# Patient Record
Sex: FEMALE | Race: WHITE | Employment: OTHER | URBAN - METROPOLITAN AREA
[De-identification: names, ages, dates, MRNs, and addresses within clinical notes are randomized per-mention and may not be internally consistent; named-entity substitution may affect disease eponyms.]

---

## 2020-11-13 ENCOUNTER — HOSPITAL ENCOUNTER (EMERGENCY)
Age: 55
Discharge: HOME | End: 2020-11-13
Attending: EMERGENCY MEDICINE
Payer: COMMERCIAL

## 2020-11-13 VITALS
HEART RATE: 90 BPM | OXYGEN SATURATION: 99 % | RESPIRATION RATE: 18 BRPM | TEMPERATURE: 98.2 F | WEIGHT: 223 LBS | BODY MASS INDEX: 42.1 KG/M2 | DIASTOLIC BLOOD PRESSURE: 56 MMHG | HEIGHT: 61 IN | SYSTOLIC BLOOD PRESSURE: 131 MMHG

## 2020-11-13 DIAGNOSIS — T78.40XA ALLERGIC REACTION, INITIAL ENCOUNTER: Primary | ICD-10-CM

## 2020-11-13 PROCEDURE — 96375 TX/PRO/DX INJ NEW DRUG ADDON: CPT

## 2020-11-13 PROCEDURE — 96374 THER/PROPH/DIAG INJ IV PUSH: CPT

## 2020-11-13 PROCEDURE — 99284 EMERGENCY DEPT VISIT MOD MDM: CPT

## 2020-11-13 PROCEDURE — 99283 EMERGENCY DEPT VISIT LOW MDM: CPT

## 2020-11-13 PROCEDURE — 74011250636 HC RX REV CODE- 250/636: Performed by: EMERGENCY MEDICINE

## 2020-11-13 RX ORDER — CARBAMAZEPINE 100 MG/1
100 TABLET, CHEWABLE ORAL 3 TIMES DAILY
Status: ON HOLD | COMMUNITY
End: 2021-12-02

## 2020-11-13 RX ORDER — PREDNISONE 10 MG/1
20 TABLET ORAL
COMMUNITY
End: 2020-11-14 | Stop reason: SDUPTHER

## 2020-11-13 RX ORDER — METHYLPREDNISOLONE 4 MG/1
TABLET ORAL
Qty: 1 DOSE PACK | Refills: 0 | Status: SHIPPED | OUTPATIENT
Start: 2020-11-13 | End: 2020-11-14 | Stop reason: SDUPTHER

## 2020-11-13 RX ORDER — DIPHENHYDRAMINE HYDROCHLORIDE 50 MG/ML
25 INJECTION, SOLUTION INTRAMUSCULAR; INTRAVENOUS
Status: COMPLETED | OUTPATIENT
Start: 2020-11-13 | End: 2020-11-13

## 2020-11-13 RX ORDER — ERGOCALCIFEROL 1.25 MG/1
50000 CAPSULE ORAL
COMMUNITY

## 2020-11-13 RX ORDER — FAMOTIDINE 10 MG/ML
20 INJECTION INTRAVENOUS
Status: COMPLETED | OUTPATIENT
Start: 2020-11-13 | End: 2020-11-13

## 2020-11-13 RX ORDER — OMEPRAZOLE 40 MG/1
40 CAPSULE, DELAYED RELEASE ORAL DAILY
COMMUNITY

## 2020-11-13 RX ORDER — CETIRIZINE HCL 10 MG
10 TABLET ORAL DAILY
Qty: 20 TAB | Refills: 0 | Status: ON HOLD | OUTPATIENT
Start: 2020-11-13 | End: 2021-12-02

## 2020-11-13 RX ADMIN — DIPHENHYDRAMINE HYDROCHLORIDE 25 MG: 50 INJECTION, SOLUTION INTRAMUSCULAR; INTRAVENOUS at 09:08

## 2020-11-13 RX ADMIN — DIPHENHYDRAMINE HYDROCHLORIDE 25 MG: 50 INJECTION, SOLUTION INTRAMUSCULAR; INTRAVENOUS at 08:26

## 2020-11-13 RX ADMIN — FAMOTIDINE 20 MG: 10 INJECTION, SOLUTION INTRAVENOUS at 08:26

## 2020-11-13 RX ADMIN — METHYLPREDNISOLONE SODIUM SUCCINATE 125 MG: 125 INJECTION, POWDER, FOR SOLUTION INTRAMUSCULAR; INTRAVENOUS at 08:26

## 2020-11-13 RX ADMIN — SODIUM CHLORIDE 1000 ML: 9 INJECTION, SOLUTION INTRAVENOUS at 09:09

## 2020-11-13 NOTE — ED PROVIDER NOTES
EMERGENCY DEPARTMENT HISTORY AND PHYSICAL EXAM      Date: 11/13/2020  Patient Name: Radha Dover      History of Presenting Illness     Chief Complaint   Patient presents with    Allergic Reaction       History Provided By: Patient and Patient's     HPI: Radha Dover, 60 y.o. female with a past medical history significant hypertension and hyperlipidemia presents to the ED with cc of rash. She went to [facility] 2 days ago for back pain and sciatica. She was given Toradol and a flu shot and prescriptions for hydrocodone and prednisone. After starting the medication she developed a generalized raised red rash on her face arms trunk and legs. [facility] and they told her to stop taking the medications. Her symptoms have not improved after taking an antihistamine. She comes in today with the rash, lip swelling, but no airway compromise. She is maintaining her own airway, able to control her secretions. No tongue swelling and no stridor. There are no other complaints, changes, or physical findings at this time. PCP: Yaw Westbrook NP    Current Outpatient Medications   Medication Sig Dispense Refill    omeprazole (PRILOSEC) 40 mg capsule Take 40 mg by mouth daily.  ergocalciferol (Vitamin D2) 1,250 mcg (50,000 unit) capsule Take 50,000 Units by mouth.  carBAMazepine (TEGretol) 100 mg chewable tablet Take 100 mg by mouth three (3) times daily.  predniSONE (DELTASONE) 10 mg tablet Take 20 mg by mouth daily (with breakfast).  cetirizine (ZyrTEC) 10 mg tablet Take 1 Tab by mouth daily. 20 Tab 0    methylPREDNISolone (Medrol, Jose,) 4 mg tablet Per dose pack instructions 1 Dose Pack 0    levothyroxine (SYNTHROID) 25 mcg tablet Take 1 Tab by mouth daily (before breakfast). After six  Refills to be done by Dr. Jessica Rivers 30 Tab 6    amitriptyline (ELAVIL) 25 mg tablet Take 25 mg by mouth nightly as needed.       simvastatin (ZOCOR) 20 mg tablet Take 20 mg by mouth nightly.  hydrochlorothiazide (HYDRODIURIL) 25 mg tablet Take 12.5 mg by mouth daily.  amitriptyline (ELAVIL) 100 mg tablet Take 100 mg by mouth daily.  eletriptan (RELPAX) 40 mg tablet Take 40 mg by mouth as needed. may repeat in 2 hours if necessary         Past History     Past Medical History:  Past Medical History:   Diagnosis Date    Chronic migraine without aura     High cholesterol     Thyroid disease        Past Surgical History:  Past Surgical History:   Procedure Laterality Date    HX  SECTION      twice    HX CHOLECYSTECTOMY      HX HERNIA REPAIR      HX ORTHOPAEDIC         Family History:  Family History   Problem Relation Age of Onset    Hypertension Father        Social History:  Social History     Tobacco Use    Smoking status: Never Smoker    Smokeless tobacco: Never Used   Substance Use Topics    Alcohol use: No    Drug use: No       Allergies: Allergies   Allergen Reactions    Sulfa (Sulfonamide Antibiotics) Hives         Review of Systems       Review of Systems   Constitutional: Negative for chills, fatigue and fever. HENT: Positive for facial swelling. Negative for congestion, drooling, sore throat, trouble swallowing and voice change. Eyes: Negative for photophobia and itching. Respiratory: Negative for cough, chest tightness, shortness of breath, wheezing and stridor. Cardiovascular: Negative for chest pain and palpitations. Gastrointestinal: Negative for abdominal pain, nausea and vomiting. Genitourinary: Negative for difficulty urinating. Musculoskeletal: Positive for back pain. Negative for myalgias, neck pain and neck stiffness. Skin: Positive for rash. Neurological: Negative for dizziness, speech difficulty, weakness and light-headedness. Psychiatric/Behavioral: Negative for agitation and confusion. The patient is not nervous/anxious.         Physical Exam     Physical Exam  Constitutional:       General: She is not in acute distress. Appearance: Normal appearance. She is not toxic-appearing. HENT:      Head: Normocephalic and atraumatic. Right Ear: External ear normal.      Left Ear: External ear normal.      Nose: Nose normal. No congestion or rhinorrhea. Mouth/Throat:      Mouth: Mucous membranes are moist.      Pharynx: No oropharyngeal exudate or posterior oropharyngeal erythema. Comments: Swollen lips. No swelling of tongue. No swelling in pharynx. Uvula visible  Eyes:      Conjunctiva/sclera: Conjunctivae normal.   Neck:      Musculoskeletal: Normal range of motion. No neck rigidity or muscular tenderness. Cardiovascular:      Rate and Rhythm: Normal rate and regular rhythm. Pulses: Normal pulses. Heart sounds: Normal heart sounds. No murmur. Pulmonary:      Effort: Pulmonary effort is normal. No respiratory distress. Breath sounds: Normal breath sounds. No stridor. No wheezing. Abdominal:      General: Bowel sounds are normal. There is no distension. Palpations: Abdomen is soft. Tenderness: There is no abdominal tenderness. Musculoskeletal: Normal range of motion. General: No swelling or tenderness. Right lower leg: No edema. Left lower leg: No edema. Skin:     General: Skin is warm and dry. Capillary Refill: Capillary refill takes less than 2 seconds. Findings: Rash present. Rash is macular, papular and urticarial.   Neurological:      General: No focal deficit present. Mental Status: She is alert and oriented to person, place, and time. Sensory: No sensory deficit. Motor: No weakness. Gait: Gait normal.   Psychiatric:         Mood and Affect: Mood normal.         Behavior: Behavior normal.         Thought Content: Thought content normal.         Judgment: Judgment normal.         Lab and Diagnostic Study Results     Labs -   No results found for this or any previous visit (from the past 12 hour(s)).     Radiologic Studies -   [unfilled]  CT Results  (Last 48 hours)    None        CXR Results  (Last 48 hours)    None          Medical Decision Making and ED Course   - I am the first and primary provider for this patient. - I reviewed the vital signs, available nursing notes, past medical history, past surgical history, family history and social history. - Initial assessment performed. The patients presenting problems have been discussed, and the staff are in agreement with the care plan formulated and outlined with them. I have encouraged them to ask questions as they arise throughout their visit. Vital Signs-Reviewed the patient's vital signs. Patient Vitals for the past 12 hrs:   Temp Pulse Resp BP SpO2   11/13/20 0936     99 %   11/13/20 0936  90 18 (!) 131/56 99 %   11/13/20 0903     100 %   11/13/20 0901  89 18 135/72 100 %   11/13/20 0814 98.2 °F (36.8 °C) 95 18 105/79 100 %         Records Reviewed: Nursing Notes    The patient presents with rash with a differential diagnosis of drug reaction, anaphylaxis, urticaria    ED Course: We will treat for allergic reaction with steroids, H1 and H2 blockers and monitor response. ED Course as of Nov 13 0948   Fri Nov 13, 2020   0920 Patient has yet to improve but she is still able to tolerate p.o. and has no increased edema in her airway. Will redose with Benadryl and start IV fluids and reevaluate.    [CS]      ED Course User Index  [CS] Ria Gill MD         Provider Notes (Medical Decision Making): The patient has not gotten worse since she has been in the emergency room. She continues to have no appreciable airway compromise and is handling her secretions without difficulty. Upon further review she has had the symptoms for approximately 3 days. She has had a new Tegretol prescription as well as a recent flu shot. She is not on an ACE inhibitor and I do not appreciate any other aspects of angioedema.   Reinspection of her airway reveals that it is patent without edema. I am going to place the patient on steroids for the next couple of days and will have her follow-up with her primary care physician for further evaluation and treatment. I have discussed the case with her primary care provider, Penny Urbina NP, who conveys that she feels as if the patient had allergic reaction approximately year ago and may benefit from outpatient allergy evaluation. She is to call the patient on Monday and attempt to have her seen and reevaluated. She agrees with care plan as outlined. MDM           Consultations:       Consultations: -    Family Practice Consultant: Dr. Sydni Arizmendi: We have asked for emergent assistance with regard to this patient. We have discussed the patients presenting complaints as well as all lab and radiology studies available. They are in agreement with the care plan as outlined. Procedures and Critical Care       Performed by: Adriana Gutierrez MD  PROCEDURES:  Procedures         Disposition     Disposition: Condition stable  DC- Adult Discharges: All of the diagnostic tests were reviewed and questions answered. Diagnosis, care plan and treatment options were discussed. The patient understands the instructions and will follow up as directed. The patients results have been reviewed with them. They have been counseled regarding their diagnosis. The patient verbally convey understanding and agreement of the signs, symptoms, diagnosis, treatment and prognosis and additionally agrees to follow up as recommended with their PCP in 24 - 48 hours. They also agree with the care-plan and convey that all of their questions have been answered.   I have also put together some discharge instructions for them that include: 1) educational information regarding their diagnosis, 2) how to care for their diagnosis at home, as well a 3) list of reasons why they would want to return to the ED prior to their follow-up appointment, should their condition change. DC-The patient was given verbal allergic reaction instructions    Discharged    Remove if not discharged  DISCHARGE PLAN:  1. Current Discharge Medication List      CONTINUE these medications which have NOT CHANGED    Details   levothyroxine (SYNTHROID) 25 mcg tablet Take 1 Tab by mouth daily (before breakfast). After six  Refills to be done by Dr. Viviane Nolasco: 30 Tab, Refills: 6      !! amitriptyline (ELAVIL) 25 mg tablet Take 25 mg by mouth nightly as needed. EPIPEN 0.3 mg/0.3 mL injection       simvastatin (ZOCOR) 20 mg tablet Take 20 mg by mouth nightly. topiramate (TOPAMAX) 100 mg tablet Take 100 mg by mouth two (2) times a day. hydrochlorothiazide (HYDRODIURIL) 25 mg tablet Take 12.5 mg by mouth daily. !! amitriptyline (ELAVIL) 100 mg tablet Take 100 mg by mouth daily. eletriptan (RELPAX) 40 mg tablet Take 40 mg by mouth as needed. may repeat in 2 hours if necessary       !! - Potential duplicate medications found. Please discuss with provider. 2.   Follow-up Information     Follow up With Specialties Details Why Contact Info    [facility], Jimmy Scott NP Nurse Practitioner   [address]  [phone]          3. Return to ED if worse   4. Current Discharge Medication List      START taking these medications    Details   cetirizine (ZyrTEC) 10 mg tablet Take 1 Tab by mouth daily. Qty: 20 Tab, Refills: 0      methylPREDNISolone (Medrol, Jose,) 4 mg tablet Per dose pack instructions  Qty: 1 Dose Pack, Refills: 0             Diagnosis     Clinical Impression:   1. Allergic reaction, initial encounter        Attestations:    Aracelis Mesa MD    Please note that this dictation was completed with [employer], the [employer] voice recognition software. Quite often unanticipated grammatical, syntax, homophones, and other interpretive errors are inadvertently transcribed by the computer software. Please disregard these errors. Please excuse any errors that have escaped final proofreading. Thank you.

## 2020-11-13 NOTE — ED TRIAGE NOTES
[facility] and gave hydrocodone, flu shot, prednisone, toradol, rash started day after meds hives on arm, went [facility], they gave her another med with zyrtec but can remember, rash only gotten worse all over body including lips, lungs clear, air way clear, air way feels scratchy but no resp distress

## 2020-11-14 ENCOUNTER — HOSPITAL ENCOUNTER (INPATIENT)
Age: 55
LOS: 4 days | Discharge: HOME | DRG: 607 | End: 2020-11-19
Attending: INTERNAL MEDICINE | Admitting: INTERNAL MEDICINE
Payer: COMMERCIAL

## 2020-11-14 DIAGNOSIS — R21 RASH: ICD-10-CM

## 2020-11-14 DIAGNOSIS — T78.3XXA ALLERGIC ANGIOEDEMA, INITIAL ENCOUNTER: ICD-10-CM

## 2020-11-14 DIAGNOSIS — L29.9 ITCHING: ICD-10-CM

## 2020-11-14 DIAGNOSIS — T78.40XA ALLERGIC REACTION, INITIAL ENCOUNTER: Primary | ICD-10-CM

## 2020-11-14 DIAGNOSIS — L50.9 HIVES: ICD-10-CM

## 2020-11-14 PROBLEM — T80.52XA: Status: ACTIVE | Noted: 2020-11-14

## 2020-11-14 PROCEDURE — 99218 HC RM OBSERVATION: CPT

## 2020-11-14 PROCEDURE — 96374 THER/PROPH/DIAG INJ IV PUSH: CPT

## 2020-11-14 PROCEDURE — 96376 TX/PRO/DX INJ SAME DRUG ADON: CPT

## 2020-11-14 PROCEDURE — 96361 HYDRATE IV INFUSION ADD-ON: CPT

## 2020-11-14 PROCEDURE — 96372 THER/PROPH/DIAG INJ SC/IM: CPT

## 2020-11-14 PROCEDURE — 74011250636 HC RX REV CODE- 250/636: Performed by: EMERGENCY MEDICINE

## 2020-11-14 PROCEDURE — 74011250637 HC RX REV CODE- 250/637: Performed by: NURSE PRACTITIONER

## 2020-11-14 PROCEDURE — 74011250636 HC RX REV CODE- 250/636: Performed by: NURSE PRACTITIONER

## 2020-11-14 PROCEDURE — 96375 TX/PRO/DX INJ NEW DRUG ADDON: CPT

## 2020-11-14 PROCEDURE — 74011250636 HC RX REV CODE- 250/636: Performed by: PHYSICIAN ASSISTANT

## 2020-11-14 PROCEDURE — 99284 EMERGENCY DEPT VISIT MOD MDM: CPT

## 2020-11-14 RX ORDER — EPINEPHRINE 0.3 MG/.3ML
0.3 INJECTION SUBCUTANEOUS ONCE
Status: COMPLETED | OUTPATIENT
Start: 2020-11-14 | End: 2020-11-14

## 2020-11-14 RX ORDER — PANTOPRAZOLE SODIUM 20 MG/1
40 TABLET, DELAYED RELEASE ORAL DAILY
Status: DISCONTINUED | OUTPATIENT
Start: 2020-11-15 | End: 2020-11-19 | Stop reason: HOSPADM

## 2020-11-14 RX ORDER — IPRATROPIUM BROMIDE AND ALBUTEROL SULFATE 2.5; .5 MG/3ML; MG/3ML
3 SOLUTION RESPIRATORY (INHALATION)
Status: DISCONTINUED | OUTPATIENT
Start: 2020-11-14 | End: 2020-11-15

## 2020-11-14 RX ORDER — FAMOTIDINE 20 MG/1
20 TABLET, FILM COATED ORAL 2 TIMES DAILY
Status: DISCONTINUED | OUTPATIENT
Start: 2020-11-14 | End: 2020-11-15

## 2020-11-14 RX ORDER — BISACODYL 5 MG
5 TABLET, DELAYED RELEASE (ENTERIC COATED) ORAL DAILY PRN
Status: DISCONTINUED | OUTPATIENT
Start: 2020-11-14 | End: 2020-11-19 | Stop reason: HOSPADM

## 2020-11-14 RX ORDER — ATORVASTATIN CALCIUM 10 MG/1
10 TABLET, FILM COATED ORAL
Status: DISCONTINUED | OUTPATIENT
Start: 2020-11-14 | End: 2020-11-19 | Stop reason: HOSPADM

## 2020-11-14 RX ORDER — DIPHENHYDRAMINE HYDROCHLORIDE 50 MG/ML
25 INJECTION, SOLUTION INTRAMUSCULAR; INTRAVENOUS EVERY 6 HOURS
Status: DISCONTINUED | OUTPATIENT
Start: 2020-11-14 | End: 2020-11-15

## 2020-11-14 RX ORDER — SIMVASTATIN 10 MG/1
20 TABLET, FILM COATED ORAL
Status: DISCONTINUED | OUTPATIENT
Start: 2020-11-14 | End: 2020-11-14

## 2020-11-14 RX ORDER — DEXAMETHASONE SODIUM PHOSPHATE 10 MG/ML
10 INJECTION INTRAMUSCULAR; INTRAVENOUS ONCE
Status: COMPLETED | OUTPATIENT
Start: 2020-11-14 | End: 2020-11-14

## 2020-11-14 RX ORDER — ENOXAPARIN SODIUM 100 MG/ML
40 INJECTION SUBCUTANEOUS DAILY
Status: DISCONTINUED | OUTPATIENT
Start: 2020-11-15 | End: 2020-11-19 | Stop reason: HOSPADM

## 2020-11-14 RX ORDER — POLYETHYLENE GLYCOL 3350 17 G/17G
17 POWDER, FOR SOLUTION ORAL DAILY PRN
Status: DISCONTINUED | OUTPATIENT
Start: 2020-11-14 | End: 2020-11-19 | Stop reason: HOSPADM

## 2020-11-14 RX ORDER — ACETAMINOPHEN 325 MG/1
650 TABLET ORAL
Status: DISCONTINUED | OUTPATIENT
Start: 2020-11-14 | End: 2020-11-19 | Stop reason: HOSPADM

## 2020-11-14 RX ORDER — ONDANSETRON 4 MG/1
4 TABLET, ORALLY DISINTEGRATING ORAL
Status: DISCONTINUED | OUTPATIENT
Start: 2020-11-14 | End: 2020-11-19 | Stop reason: HOSPADM

## 2020-11-14 RX ORDER — EPINEPHRINE 0.1 MG/ML
1 INJECTION INTRACARDIAC; INTRAVENOUS
Status: DISCONTINUED | OUTPATIENT
Start: 2020-11-14 | End: 2020-11-14

## 2020-11-14 RX ORDER — DIPHENHYDRAMINE HYDROCHLORIDE 50 MG/ML
25 INJECTION, SOLUTION INTRAMUSCULAR; INTRAVENOUS
Status: COMPLETED | OUTPATIENT
Start: 2020-11-14 | End: 2020-11-14

## 2020-11-14 RX ORDER — ONDANSETRON 2 MG/ML
4 INJECTION INTRAMUSCULAR; INTRAVENOUS
Status: DISCONTINUED | OUTPATIENT
Start: 2020-11-14 | End: 2020-11-19 | Stop reason: HOSPADM

## 2020-11-14 RX ORDER — IBUPROFEN 400 MG/1
400 TABLET ORAL 3 TIMES DAILY
Status: DISCONTINUED | OUTPATIENT
Start: 2020-11-14 | End: 2020-11-15

## 2020-11-14 RX ORDER — SODIUM CHLORIDE 9 MG/ML
125 INJECTION, SOLUTION INTRAVENOUS CONTINUOUS
Status: DISCONTINUED | OUTPATIENT
Start: 2020-11-14 | End: 2020-11-18

## 2020-11-14 RX ORDER — ACETAMINOPHEN 650 MG/1
650 SUPPOSITORY RECTAL
Status: DISCONTINUED | OUTPATIENT
Start: 2020-11-14 | End: 2020-11-19 | Stop reason: HOSPADM

## 2020-11-14 RX ADMIN — IBUPROFEN 400 MG: 400 TABLET, FILM COATED ORAL at 20:39

## 2020-11-14 RX ADMIN — SIMVASTATIN 20 MG: 10 TABLET, FILM COATED ORAL at 20:39

## 2020-11-14 RX ADMIN — METHYLPREDNISOLONE SODIUM SUCCINATE 40 MG: 40 INJECTION, POWDER, FOR SOLUTION INTRAMUSCULAR; INTRAVENOUS at 17:41

## 2020-11-14 RX ADMIN — SODIUM CHLORIDE 125 ML/HR: 9 INJECTION, SOLUTION INTRAVENOUS at 17:41

## 2020-11-14 RX ADMIN — DEXAMETHASONE SODIUM PHOSPHATE 10 MG: 10 INJECTION, SOLUTION INTRAMUSCULAR; INTRAVENOUS at 12:31

## 2020-11-14 RX ADMIN — EPINEPHRINE 0.3 MG: 0.3 INJECTION INTRAMUSCULAR at 15:00

## 2020-11-14 RX ADMIN — DIPHENHYDRAMINE HYDROCHLORIDE 25 MG: 50 INJECTION, SOLUTION INTRAMUSCULAR; INTRAVENOUS at 12:31

## 2020-11-14 RX ADMIN — FAMOTIDINE 20 MG: 20 TABLET ORAL at 20:39

## 2020-11-14 RX ADMIN — DIPHENHYDRAMINE HYDROCHLORIDE 25 MG: 50 INJECTION, SOLUTION INTRAMUSCULAR; INTRAVENOUS at 17:41

## 2020-11-14 RX ADMIN — SODIUM CHLORIDE 500 ML: 9 INJECTION, SOLUTION INTRAVENOUS at 12:31

## 2020-11-14 RX ADMIN — FAMOTIDINE 10 MG: 10 INJECTION, SOLUTION INTRAVENOUS at 12:31

## 2020-11-14 NOTE — H&P
History and Physical    Patient: Darek Benavides MRN: [id number]  SSN: [id number]    YOB: 1965  Age: 60 y.o. Sex: female      Subjective: Darek Benavides is a 60 y.o. female who has PMH significant for chronic pain, hypothyroidism and seasonal allergies. She comes into the emergency room after an allergic reaction to flu shot she received on Wednesday. She started having a reaction the next morning with ulcers in her mouth, sore throat with edema, diffuse erythematous rash covering face arms trunk and legs. She went back to emergent outpatient care center was given prednisone and Atarax without relief. She comes to this emergency room with same findings diffuse whole-body rash with oral mucosal involvement, and associated symptoms of itching. Received 1 dose of epi and Benadryl in the ED without relief. Will admit to inpatient. Will start repeat dose of epi, continuous IV fluids, every 6 hour Benadryl IV, every 6 hours Solu-Medrol, scheduled ibuprofen, duo nebs, oxygen, full liquid diet and speech evaluation. .     Past Medical History:   Diagnosis Date    Chronic migraine without aura     High cholesterol     Thyroid disease      Past Surgical History:   Procedure Laterality Date    HX  SECTION      twice    HX CHOLECYSTECTOMY      HX HERNIA REPAIR      HX ORTHOPAEDIC        Family History   Problem Relation Age of Onset    Hypertension Father      Social History     Tobacco Use    Smoking status: Never Smoker    Smokeless tobacco: Never Used   Substance Use Topics    Alcohol use: No      Prior to Admission medications    Medication Sig Start Date End Date Taking? Authorizing Provider   omeprazole (PRILOSEC) 40 mg capsule Take 40 mg by mouth daily. Yes Other, MD Casey   carBAMazepine (TEGretol) 100 mg chewable tablet Take 100 mg by mouth three (3) times daily. Yes Other, MD Casey   cetirizine (ZyrTEC) 10 mg tablet Take 1 Tab by mouth daily.  20  Yes Laney Loya MD   levothyroxine (SYNTHROID) 25 mcg tablet Take 1 Tab by mouth daily (before breakfast). After six  Refills to be done by Dr. Landon Ahumada 6/13/11  Yes Natalie Capellan MD   simvastatin (ZOCOR) 20 mg tablet Take 20 mg by mouth nightly. 4/4/11  Yes Provider, Historical   ergocalciferol (Vitamin D2) 1,250 mcg (50,000 unit) capsule Take 50,000 Units by mouth. Other, MD Casey   amitriptyline (ELAVIL) 25 mg tablet Take 25 mg by mouth nightly as needed. 4/4/11   Provider, Historical   hydrochlorothiazide (HYDRODIURIL) 25 mg tablet Take 12.5 mg by mouth daily. Provider, Historical   amitriptyline (ELAVIL) 100 mg tablet Take 100 mg by mouth daily. 4/4/11   Provider, Historical   eletriptan (RELPAX) 40 mg tablet Take 40 mg by mouth as needed. may repeat in 2 hours if necessary 4/4/11   Provider, Historical        Allergies   Allergen Reactions    Sulfa (Sulfonamide Antibiotics) Hives       Review of Systems:  Constitutional: No fevers, No chills, No fatigue, + weakness  HEENT: +++ Discomfort from rash on face and ulcers in mouth  Respiratory: No cough, No sputum, No wheezing, No SOB, throat tightness  Cardiovascular: No chest pain, No lower extremity edema, No Palpitations   Gastrointestinal: No nausea, No vomiting, No diarrhea, No constipation, No abdominal pain  Genitourinary: No frequency, No dysuria, No hematuria  Integument/Breast: No rash, No skin lesion(s), No dryness  Musculoskeletal: No arthralgias, No neck pain, No back pain  Neurological: No headaches, No dizziness, No confusion,  No seizures  Behavioral/Psychiatric: +++ anxiety, No depression      Objective:     Vitals:    11/14/20 1131 11/14/20 1612   BP: 128/73 122/70   Pulse: 93 83   Resp: 20 18   Temp: 98.8 °F (37.1 °C)    SpO2: 100% 99%   Weight: 101.2 kg (223 lb)    Height: 5' 1\" (1.549 m)         Physical Exam:  General: alert, cooperative, no distress  Eye: conjunctivae/corneas clear. PERRL, EOM's intact.    Throat and Neck: Use disseminated rash covering face, ears, ulcerations and mouth, neck  Lung: clear to auscultation bilaterally  Heart: regular rate and rhythm,   Abdomen: soft, non-tender. Bowel sounds normal. No masses,  Extremities:  able to move all extremities normal, atraumatic  Skin: Diffuse disseminated rash covering entire body involving oral mucosa of the mouth   neurologic: AOx3. Cranial nerves 2-12 and sensation grossly intact. Psychiatric: non focal    No results found for this or any previous visit (from the past 24 hour(s)). XR Results (maximum last 3): No results found for this or any previous visit. CT Results (maximum last 3): No results found for this or any previous visit. MRI Results (maximum last 3): No results found for this or any previous visit. Nuclear Medicine Results (maximum last 3): Results from Abstract encounter on 04/21/11   NM THYROID IMAGING W UPTAKE MULTIPLE       US Results (maximum last 3): No results found for this or any previous visit. Active Problems:    Allergic reaction (11/14/2020)      Anaphylactic reaction due to vaccination (11/14/2020)      Hives (11/14/2020)      Angioedema (11/14/2020)      Itching (11/14/2020)        Assessment/Plan:     1. Anaphylactic reaction to flu vaccine:  2. Oral ulcerations and angioedema:  3.   Diffuse rash/hives covering body:  · Repeat epi injection  · Schedule IV Benadryl every 6 hours, IV Solu-Medrol every 6 hours, duo nebs every 6 hours, ibuprofen every 8 hours  · IV fluids  · Oxygen  · Speech therapy for evaluation  · Full liquid diet    DVT Prophylaxis: Lovenox  Code Status: Full  POA/NOK:   Total Time spent in direct and indirect care including assessment review of labs and coordination of services and consultations: Greater than 75 minutes      Signed By: Elizabeth Loya NP     November 14, 2020

## 2020-11-14 NOTE — ROUTINE PROCESS
TRANSFER - OUT REPORT: 
 
Verbal report given to Anshu Ward on Darci Arzate  being transferred to [facility] routine progression of care Report consisted of patients Situation, Background, Assessment and  
Recommendations(SBAR). Information from the following report(s) SBAR was reviewed with the receiving nurse. Lines:  
Peripheral IV 11/14/20 Left Antecubital (Active) Opportunity for questions and clarification was provided. Patient transported with: 
 [employer]

## 2020-11-14 NOTE — ED TRIAGE NOTES
Patient reports hives that have been spreading since Thursday, received a flu shot on Tuesday, has been seen at two different facility for the reaction with no relief. Reports pain while swallowing.

## 2020-11-14 NOTE — ED PROVIDER NOTES
EMERGENCY DEPARTMENT HISTORY AND PHYSICAL EXAM      Date: 11/14/2020  Patient Name: Josue Lyons    History of Presenting Illness     Chief Complaint   Patient presents with    Allergic Reaction       History Provided By: Patient    HPI: Josue Lyons, 60 y.o. female with a past medical history significant for hypothyroidism who   presents to the ED with cc of allergic reaction since Wednesday. Pt states that she had a flu shot on Wednesday and rash started not long after that. Pt states that she was given prednisone and atarax but only took one pill of each without relief. Pt denies chest pain or sob. There are no other complaints, changes, or physical findings at this time. PCP: Oliva Leon NP    No current facility-administered medications on file prior to encounter. Current Outpatient Medications on File Prior to Encounter   Medication Sig Dispense Refill    omeprazole (PRILOSEC) 40 mg capsule Take 40 mg by mouth daily.  ergocalciferol (Vitamin D2) 1,250 mcg (50,000 unit) capsule Take 50,000 Units by mouth.  carBAMazepine (TEGretol) 100 mg chewable tablet Take 100 mg by mouth three (3) times daily.  predniSONE (DELTASONE) 10 mg tablet Take 20 mg by mouth daily (with breakfast).  cetirizine (ZyrTEC) 10 mg tablet Take 1 Tab by mouth daily. 20 Tab 0    methylPREDNISolone (Medrol, Jose,) 4 mg tablet Per dose pack instructions 1 Dose Pack 0    levothyroxine (SYNTHROID) 25 mcg tablet Take 1 Tab by mouth daily (before breakfast). After six  Refills to be done by Dr. Casie Hardwick 30 Tab 6    amitriptyline (ELAVIL) 25 mg tablet Take 25 mg by mouth nightly as needed.  simvastatin (ZOCOR) 20 mg tablet Take 20 mg by mouth nightly.  hydrochlorothiazide (HYDRODIURIL) 25 mg tablet Take 12.5 mg by mouth daily.  amitriptyline (ELAVIL) 100 mg tablet Take 100 mg by mouth daily.  eletriptan (RELPAX) 40 mg tablet Take 40 mg by mouth as needed.  may repeat in 2 hours if necessary         Past History     Past Medical History:  Past Medical History:   Diagnosis Date    Chronic migraine without aura     High cholesterol     Thyroid disease        Past Surgical History:  Past Surgical History:   Procedure Laterality Date    HX  SECTION      twice    HX CHOLECYSTECTOMY      HX HERNIA REPAIR      HX ORTHOPAEDIC         Family History:  Family History   Problem Relation Age of Onset    Hypertension Father        Social History:  Social History     Tobacco Use    Smoking status: Never Smoker    Smokeless tobacco: Never Used   Substance Use Topics    Alcohol use: No    Drug use: No       Allergies: Allergies   Allergen Reactions    Sulfa (Sulfonamide Antibiotics) Hives         Review of Systems     Review of Systems   Constitutional: Negative. HENT: Negative. Eyes: Negative. Respiratory: Negative. Cardiovascular: Negative. Gastrointestinal: Negative. Endocrine: Negative. Genitourinary: Negative. Musculoskeletal: Negative. Skin: Positive for rash. Allergic/Immunologic: Negative. Neurological: Negative. Hematological: Negative. Psychiatric/Behavioral: Negative. Physical Exam     Physical Exam  Vitals signs and nursing note reviewed. Constitutional:       Appearance: Normal appearance. Cardiovascular:      Rate and Rhythm: Normal rate and regular rhythm. Pulmonary:      Effort: Pulmonary effort is normal.      Breath sounds: Normal breath sounds. Skin:     Findings: Rash present. Comments: + GENERALIZED URTICARIA RASH NOTED   Neurological:      General: No focal deficit present. Mental Status: She is alert and oriented to person, place, and time. Psychiatric:         Mood and Affect: Mood normal.         Behavior: Behavior normal.         Lab and Diagnostic Study Results     Labs -   No results found for this or any previous visit (from the past 12 hour(s)).     Radiologic Studies -   @lastxrresult@  CT Results  (Last 48 hours)    None        CXR Results  (Last 48 hours)    None            Medical Decision Making   - I am the first provider for this patient. - I reviewed the vital signs, available nursing notes, past medical history, past surgical history, family history and social history. - Initial assessment performed. The patients presenting problems have been discussed, and they are in agreement with the care plan formulated and outlined with them. I have encouraged them to ask questions as they arise throughout their visit. Vital Signs-Reviewed the patient's vital signs. Patient Vitals for the past 12 hrs:   Temp Pulse Resp BP SpO2   11/14/20 1612  83 18 122/70 99 %   11/14/20 1131 98.8 °F (37.1 °C) 93 20 128/73 100 %       Records Reviewed: Nursing Notes    The patient presents with a differential diagnosis of allergic reaction      ED Course:          Provider Notes (Medical Decision Making): MDM       Procedures   Medical Decision Makingedical Decision Making  Performed by: GREG Caraballo  PROCEDURES:  Procedures       Disposition   Disposition: Admitted to Observation Unit the case was discussed with the admitting physician [address] [place] [address] Drive:  1. Current Discharge Medication List      CONTINUE these medications which have NOT CHANGED    Details   omeprazole (PRILOSEC) 40 mg capsule Take 40 mg by mouth daily. ergocalciferol (Vitamin D2) 1,250 mcg (50,000 unit) capsule Take 50,000 Units by mouth.      carBAMazepine (TEGretol) 100 mg chewable tablet Take 100 mg by mouth three (3) times daily. predniSONE (DELTASONE) 10 mg tablet Take 20 mg by mouth daily (with breakfast). cetirizine (ZyrTEC) 10 mg tablet Take 1 Tab by mouth daily. Qty: 20 Tab, Refills: 0      methylPREDNISolone (Medrol, Jose,) 4 mg tablet Per dose pack instructions  Qty: 1 Dose Pack, Refills: 0      levothyroxine (SYNTHROID) 25 mcg tablet Take 1 Tab by mouth daily (before breakfast).  After six Refills to be done by Dr. Krunal Murphy: 30 Tab, Refills: 6      !! amitriptyline (ELAVIL) 25 mg tablet Take 25 mg by mouth nightly as needed. simvastatin (ZOCOR) 20 mg tablet Take 20 mg by mouth nightly. hydrochlorothiazide (HYDRODIURIL) 25 mg tablet Take 12.5 mg by mouth daily. !! amitriptyline (ELAVIL) 100 mg tablet Take 100 mg by mouth daily. eletriptan (RELPAX) 40 mg tablet Take 40 mg by mouth as needed. may repeat in 2 hours if necessary       !! - Potential duplicate medications found. Please discuss with provider. 2.   Follow-up Information    None       3. Return to ED if worse   4. Current Discharge Medication List            Diagnosis     Clinical Impression:   1. Allergic reaction, initial encounter        Attestations:    Nika Albright, PA    Please note that this dictation was completed with [employer], the computer voice recognition software. Quite often unanticipated grammatical, syntax, homophones, and other interpretive errors are inadvertently transcribed by the computer software. Please disregard these errors. Please excuse any errors that have escaped final proofreading. Thank you.

## 2020-11-15 PROBLEM — Z88.9 DRUG ALLERGY: Status: ACTIVE | Noted: 2020-11-15

## 2020-11-15 PROBLEM — T78.3XXA ALLERGIC ANGIOEDEMA: Status: ACTIVE | Noted: 2020-11-15

## 2020-11-15 LAB
ANION GAP SERPL CALC-SCNC: 6 MMOL/L (ref 5–15)
BUN SERPL-MCNC: 12 MG/DL (ref 6–20)
BUN/CREAT SERPL: 14 (ref 12–20)
CA-I BLD-MCNC: 8.2 MG/DL (ref 8.5–10.1)
CHLORIDE SERPL-SCNC: 108 MMOL/L (ref 97–108)
CO2 SERPL-SCNC: 24 MMOL/L (ref 21–32)
CREAT SERPL-MCNC: 0.85 MG/DL (ref 0.55–1.02)
ERYTHROCYTE [DISTWIDTH] IN BLOOD BY AUTOMATED COUNT: 15.2 % (ref 11.5–14.5)
GLUCOSE SERPL-MCNC: 149 MG/DL (ref 65–100)
HCT VFR BLD AUTO: 36.7 % (ref 35–47)
HGB BLD-MCNC: 11.7 G/DL (ref 11.5–16)
MCH RBC QN AUTO: 27.7 PG (ref 26–34)
MCHC RBC AUTO-ENTMCNC: 31.9 G/DL (ref 30–36.5)
MCV RBC AUTO: 86.8 FL (ref 80–99)
PLATELET # BLD AUTO: 304 K/UL (ref 150–400)
PMV BLD AUTO: 10 FL (ref 8.9–12.9)
POTASSIUM SERPL-SCNC: 4 MMOL/L (ref 3.5–5.1)
RBC # BLD AUTO: 4.23 M/UL (ref 3.8–5.2)
SODIUM SERPL-SCNC: 138 MMOL/L (ref 136–145)
WBC # BLD AUTO: 5.6 K/UL (ref 3.6–11)

## 2020-11-15 PROCEDURE — 74011250636 HC RX REV CODE- 250/636: Performed by: NURSE PRACTITIONER

## 2020-11-15 PROCEDURE — 94640 AIRWAY INHALATION TREATMENT: CPT

## 2020-11-15 PROCEDURE — 86663 EPSTEIN-BARR ANTIBODY: CPT

## 2020-11-15 PROCEDURE — 99218 HC RM OBSERVATION: CPT

## 2020-11-15 PROCEDURE — 96376 TX/PRO/DX INJ SAME DRUG ADON: CPT

## 2020-11-15 PROCEDURE — 99221 1ST HOSP IP/OBS SF/LOW 40: CPT | Performed by: INTERNAL MEDICINE

## 2020-11-15 PROCEDURE — 74011250637 HC RX REV CODE- 250/637: Performed by: INTERNAL MEDICINE

## 2020-11-15 PROCEDURE — 74011250637 HC RX REV CODE- 250/637: Performed by: NURSE PRACTITIONER

## 2020-11-15 PROCEDURE — 92610 EVALUATE SWALLOWING FUNCTION: CPT

## 2020-11-15 PROCEDURE — 80048 BASIC METABOLIC PNL TOTAL CA: CPT

## 2020-11-15 PROCEDURE — 74011000250 HC RX REV CODE- 250: Performed by: NURSE PRACTITIONER

## 2020-11-15 PROCEDURE — 65270000029 HC RM PRIVATE

## 2020-11-15 PROCEDURE — 86644 CMV ANTIBODY: CPT

## 2020-11-15 PROCEDURE — 36415 COLL VENOUS BLD VENIPUNCTURE: CPT

## 2020-11-15 PROCEDURE — 85027 COMPLETE CBC AUTOMATED: CPT

## 2020-11-15 RX ORDER — KETOTIFEN FUMARATE 0.35 MG/ML
1 SOLUTION/ DROPS OPHTHALMIC 2 TIMES DAILY
Status: DISCONTINUED | OUTPATIENT
Start: 2020-11-15 | End: 2020-11-19 | Stop reason: HOSPADM

## 2020-11-15 RX ORDER — IPRATROPIUM BROMIDE AND ALBUTEROL SULFATE 2.5; .5 MG/3ML; MG/3ML
3 SOLUTION RESPIRATORY (INHALATION)
Status: DISCONTINUED | OUTPATIENT
Start: 2020-11-15 | End: 2020-11-19 | Stop reason: HOSPADM

## 2020-11-15 RX ORDER — HYDROCHLOROTHIAZIDE 25 MG/1
12.5 TABLET ORAL DAILY
Status: DISCONTINUED | OUTPATIENT
Start: 2020-11-15 | End: 2020-11-19 | Stop reason: HOSPADM

## 2020-11-15 RX ORDER — CAMPHOR
SPIRIT TOPICAL 4 TIMES DAILY
Status: DISCONTINUED | OUTPATIENT
Start: 2020-11-15 | End: 2020-11-19 | Stop reason: HOSPADM

## 2020-11-15 RX ORDER — ALPRAZOLAM 0.5 MG/1
0.5 TABLET ORAL 2 TIMES DAILY
Status: DISCONTINUED | OUTPATIENT
Start: 2020-11-15 | End: 2020-11-19 | Stop reason: HOSPADM

## 2020-11-15 RX ORDER — LEVOTHYROXINE SODIUM 25 UG/1
25 TABLET ORAL
Status: DISCONTINUED | OUTPATIENT
Start: 2020-11-16 | End: 2020-11-19 | Stop reason: HOSPADM

## 2020-11-15 RX ORDER — HYDROXYZINE PAMOATE 50 MG/1
50 CAPSULE ORAL
Status: DISCONTINUED | OUTPATIENT
Start: 2020-11-15 | End: 2020-11-15

## 2020-11-15 RX ORDER — HYDROXYZINE 25 MG/1
50 TABLET, FILM COATED ORAL
Status: DISCONTINUED | OUTPATIENT
Start: 2020-11-15 | End: 2020-11-19 | Stop reason: HOSPADM

## 2020-11-15 RX ORDER — LIDOCAINE HYDROCHLORIDE 20 MG/ML
15 SOLUTION OROPHARYNGEAL AS NEEDED
Status: DISCONTINUED | OUTPATIENT
Start: 2020-11-15 | End: 2020-11-19 | Stop reason: HOSPADM

## 2020-11-15 RX ADMIN — IPRATROPIUM BROMIDE AND ALBUTEROL SULFATE 3 ML: .5; 3 SOLUTION RESPIRATORY (INHALATION) at 01:39

## 2020-11-15 RX ADMIN — HYDROXYZINE HYDROCHLORIDE 50 MG: 25 TABLET, FILM COATED ORAL at 18:37

## 2020-11-15 RX ADMIN — METHYLPREDNISOLONE SODIUM SUCCINATE 40 MG: 40 INJECTION, POWDER, FOR SOLUTION INTRAMUSCULAR; INTRAVENOUS at 11:37

## 2020-11-15 RX ADMIN — IBUPROFEN 400 MG: 400 TABLET, FILM COATED ORAL at 17:22

## 2020-11-15 RX ADMIN — DIPHENHYDRAMINE HYDROCHLORIDE, ZINC ACETATE: 2; .1 CREAM TOPICAL at 13:05

## 2020-11-15 RX ADMIN — LIDOCAINE HYDROCHLORIDE 15 ML: 20 SOLUTION ORAL; TOPICAL at 11:44

## 2020-11-15 RX ADMIN — ATORVASTATIN CALCIUM 10 MG: 10 TABLET, FILM COATED ORAL at 21:28

## 2020-11-15 RX ADMIN — IPRATROPIUM BROMIDE AND ALBUTEROL SULFATE 3 ML: .5; 3 SOLUTION RESPIRATORY (INHALATION) at 10:19

## 2020-11-15 RX ADMIN — CALAMINE 8% AND ZINC OXIDE 8%: 160 LOTION TOPICAL at 21:25

## 2020-11-15 RX ADMIN — METHYLPREDNISOLONE SODIUM SUCCINATE 40 MG: 40 INJECTION, POWDER, FOR SOLUTION INTRAMUSCULAR; INTRAVENOUS at 05:41

## 2020-11-15 RX ADMIN — FAMOTIDINE 20 MG: 20 TABLET ORAL at 09:06

## 2020-11-15 RX ADMIN — DIPHENHYDRAMINE HYDROCHLORIDE 25 MG: 50 INJECTION, SOLUTION INTRAMUSCULAR; INTRAVENOUS at 05:41

## 2020-11-15 RX ADMIN — METHYLPREDNISOLONE SODIUM SUCCINATE 40 MG: 40 INJECTION, POWDER, FOR SOLUTION INTRAMUSCULAR; INTRAVENOUS at 00:56

## 2020-11-15 RX ADMIN — DIPHENHYDRAMINE HYDROCHLORIDE 25 MG: 50 INJECTION, SOLUTION INTRAMUSCULAR; INTRAVENOUS at 00:56

## 2020-11-15 RX ADMIN — ALPRAZOLAM 0.5 MG: 0.5 TABLET ORAL at 21:27

## 2020-11-15 RX ADMIN — KETOTIFEN FUMARATE 1 DROP: 0.35 SOLUTION/ DROPS OPHTHALMIC at 21:26

## 2020-11-15 RX ADMIN — DIPHENHYDRAMINE HYDROCHLORIDE 25 MG: 50 INJECTION, SOLUTION INTRAMUSCULAR; INTRAVENOUS at 17:22

## 2020-11-15 RX ADMIN — PANTOPRAZOLE SODIUM 40 MG: 20 TABLET, DELAYED RELEASE ORAL at 09:06

## 2020-11-15 RX ADMIN — SODIUM CHLORIDE 125 ML/HR: 9 INJECTION, SOLUTION INTRAVENOUS at 00:56

## 2020-11-15 RX ADMIN — FAMOTIDINE 20 MG: 10 INJECTION INTRAVENOUS at 21:28

## 2020-11-15 RX ADMIN — METHYLPREDNISOLONE SODIUM SUCCINATE 40 MG: 40 INJECTION, POWDER, FOR SOLUTION INTRAMUSCULAR; INTRAVENOUS at 17:21

## 2020-11-15 RX ADMIN — LIDOCAINE HYDROCHLORIDE 15 ML: 20 SOLUTION ORAL; TOPICAL at 10:44

## 2020-11-15 RX ADMIN — METHYLPREDNISOLONE SODIUM SUCCINATE 125 MG: 125 INJECTION, POWDER, FOR SOLUTION INTRAMUSCULAR; INTRAVENOUS at 21:28

## 2020-11-15 RX ADMIN — SODIUM CHLORIDE 125 ML/HR: 9 INJECTION, SOLUTION INTRAVENOUS at 18:37

## 2020-11-15 RX ADMIN — LIDOCAINE HYDROCHLORIDE 15 ML: 20 SOLUTION ORAL; TOPICAL at 17:20

## 2020-11-15 RX ADMIN — KETOTIFEN FUMARATE 1 DROP: 0.35 SOLUTION/ DROPS OPHTHALMIC at 13:05

## 2020-11-15 RX ADMIN — IBUPROFEN 400 MG: 400 TABLET, FILM COATED ORAL at 09:06

## 2020-11-15 RX ADMIN — SODIUM CHLORIDE 125 ML/HR: 9 INJECTION, SOLUTION INTRAVENOUS at 05:51

## 2020-11-15 RX ADMIN — HYDROCHLOROTHIAZIDE 12.5 MG: 25 TABLET ORAL at 11:37

## 2020-11-15 RX ADMIN — DIPHENHYDRAMINE HYDROCHLORIDE 25 MG: 50 INJECTION, SOLUTION INTRAMUSCULAR; INTRAVENOUS at 11:37

## 2020-11-15 NOTE — PROGRESS NOTES
Two nurse admission skin assessment completed by T.Goodman MENDOZA and KAT Timmons RN. Patient has a red,raised rash that covers entire body,extremities and face.

## 2020-11-15 NOTE — PROGRESS NOTES
Hospitalist Progress Note    Subjective:   Daily Progress Note: 11/15/2020 10:16 AM    Hospital Course: Scott Varma is a 60 y.o. female who has PMH significant for chronic pain, hypothyroidism and seasonal allergies. She comes into the emergency room after an allergic reaction to flu shot she received on Wednesday. She started having a reaction the next morning with ulcers in her mouth, sore throat with edema, diffuse erythematous rash covering face arms trunk and legs. She went back to emergent outpatient care center was given prednisone and Atarax without relief. She comes to this emergency room with same findings diffuse whole-body rash with oral mucosal involvement, and associated symptoms of itching. Received 1 dose of epi and Benadryl in the ED without relief. Will admit to inpatient. Will start repeat dose of epi, continuous IV fluids, every 6 hour Benadryl IV, every 6 hours Solu-Medrol, scheduled ibuprofen, duo nebs, oxygen, full liquid diet and speech evaluation. Most likely by presentation not a reaction to flu vaccine but to Tegretol and new medication she was started on a couple of weeks earlier. Subjective:  Examined patient at the bedside. She continues to have diffuse disseminated raised erythematous rash. She complains of itching, dry eyes and sore throat. Discussed plan of care including eyedrops, lidocaine rinse, and Benadryl topical.  Jose Luis at length plan of care. Discussed her presentation more consistent with drug induced allergy rather than anaphylactic reaction to the flu shot.   Current Facility-Administered Medications   Medication Dose Route Frequency    lidocaine (XYLOCAINE) 2 % viscous solution 15 mL  15 mL Mouth/Throat PRN    diphenhydrAMINE-zinc acetate 2%-0.1% (BENADRYL) cream   Topical Q6H PRN    ketotifen (ZADITOR) 0.025 % (0.035 %) ophthalmic solution 1 Drop  1 Drop Both Eyes BID    acetaminophen (TYLENOL) tablet 650 mg  650 mg Oral Q6H PRN    Or    acetaminophen (TYLENOL) suppository 650 mg  650 mg Rectal Q6H PRN    polyethylene glycol (MIRALAX) packet 17 g  17 g Oral DAILY PRN    bisacodyL (DULCOLAX) tablet 5 mg  5 mg Oral DAILY PRN    ondansetron (ZOFRAN ODT) tablet 4 mg  4 mg Oral Q6H PRN    Or    ondansetron (ZOFRAN) injection 4 mg  4 mg IntraVENous Q6H PRN    enoxaparin (LOVENOX) injection 40 mg  40 mg SubCUTAneous DAILY    diphenhydrAMINE (BENADRYL) injection 25 mg  25 mg IntraVENous Q6H    0.9% sodium chloride infusion  125 mL/hr IntraVENous CONTINUOUS    methylPREDNISolone (PF) (SOLU-MEDROL) injection 40 mg  40 mg IntraVENous Q6H    albuterol-ipratropium (DUO-NEB) 2.5 MG-0.5 MG/3 ML  3 mL Nebulization Q6H RT    pantoprazole (PROTONIX) tablet 40 mg  40 mg Oral DAILY    ibuprofen (MOTRIN) tablet 400 mg  400 mg Oral TID    famotidine (PEPCID) tablet 20 mg  20 mg Oral BID    atorvastatin (LIPITOR) tablet 10 mg  10 mg Oral QHS        REVIEW OF SYSTEMS    Review of Systems   Eyes: Negative. Respiratory: Negative. Cardiovascular: Negative. Gastrointestinal: Negative. Skin: Positive for itching and rash. Diffuse disseminated raised hives/rash   Neurological: Negative. Objective:     Visit Vitals  /61 (BP 1 Location: Right arm, BP Patient Position: At rest)   Pulse 75   Temp 98.6 °F (37 °C)   Resp 18   Ht 5' 1\" (1.549 m)   Wt 103.7 kg (228 lb 9.9 oz)   SpO2 96%   BMI 43.20 kg/m²      O2 Device: Room air    Temp (24hrs), Av.9 °F (37.2 °C), Min:98.3 °F (36.8 °C), Max:99.8 °F (37.7 °C)      No intake/output data recorded.  1901 - 11/15 0700  In: 1000 [I.V.:1000]  Out: -     PHYSICAL EXAM:    Physical Exam  Skin:     Findings: Erythema and rash present. Data Review    No results found for this or any previous visit (from the past 24 hour(s)).     No orders to display       Active Problems:    Allergic reaction (2020)      Anaphylactic reaction due to vaccination (2020)      Hives (11/14/2020)      Angioedema (11/14/2020)      Itching (11/14/2020)      Drug allergy (11/15/2020)      Allergic angioedema (11/15/2020)        Assessment/Plan:     1. Anaphylactic reaction to flu vaccine:  2. Oral ulcerations and angioedema:  3. Diffuse rash/hives covering body:  · Repeat epi injection  · Schedule IV Benadryl every 6 hours, IV Solu-Medrol every 6 hours, duo nebs every 6 hours, ibuprofen every 8 hours, Benadryl topical for itching, Zaditor ophthalmic  · Continue IV fluids  · Oxygen not required  · Speech therapy for evaluation, will start lidocaine rinse  · Full liquid diet, will advance    4. Hypothyroidism:  · Continue home medication     DVT Prophylaxis: Lovenox  Code Status: Full  POA/NOK: Husband______________________________________________________________________________  Time spent in direct care including coordination of service, review of data and examination: > 35 minutes  Care Plan discussed with:   ______________________________________________________________________________    Kassy Sanchez, JOSIE    This is dictation was done by dragon, computer voice recognition software. Quite often unanticipated grammatical, syntax, homophones and other interpretive errors or inadvertently transcribed by the computer software. Please excuse errors that have escaped final proofreading. Thank you.

## 2020-11-15 NOTE — PROGRESS NOTES
[address] SWALLOW EVALUATIONS  Patient: Darek Benavides (60 y.o. female)  Date: 11/15/2020  Diagnosis: Allergic reaction [T78.40XA]  Anaphylactic reaction due to vaccination Raul Kay <principal problem not specified>  Precautions:      ASSESSMENT :  Patient presents w/ minimal oral dysphagia s/t oral pain s/t oral ulcers. Per chart review, allergic reaction. Patient reports improvement in pain and edema. Anterior lingual edema was not observed. Oral phase c/b slow mastication and bolus manipulation s/t pain. Pharyngeal phase c/b timely swallow and HLE is wfl upon palpation. No overt s/s of pen/asp observed. She may benefit from lidocaine rinse as medically appropriate. PLAN :  Recommendations and Planned Interventions:  Rec upgrade to regular/thin diet as tolerated. Rec lidocaine rinse as medically appropriate. No further ST intervention at this time. SUBJECTIVE:   Patient denies hx of dysphagia and reports improvement in PO tolerance and oral pain. Patient reports she has eaten crackers and cookies all night. OBJECTIVE:     Past Medical History:   Diagnosis Date    Chronic migraine without aura     GERD (gastroesophageal reflux disease)     High cholesterol     Thyroid disease        CXR Results  (Last 48 hours)    None          Diet prior to admission: Regular  Current Diet:  DIET FULL LIQUID     Cognitive and Communication Status:  Neurologic State: Alert  Orientation Level: Oriented X4  Cognition: Appropriate decision making, Appropriate for age attention/concentration, Appropriate safety awareness           Swallowing Evaluation:   Oral Assessment:  Oral Assessment  Labial: No impairment  Dentition: Natural  Oral Hygiene: red oral mucosa w/ concerns for sores/blister  Lingual: No impairment  Velum: No impairment  Mandible: No impairment  P.O.  Trials:  Patient Position: upright in bed  Vocal quality prior to P.O.: No impairment  Consistency Presented: Puree; Solid; Thin liquid        Bolus Acceptance: No impairment  Bolus Formation/Control: Impaired  Type of Impairment: Other (comment)(bolus manipulation slow s/t pain from oral sores)  Propulsion: No impairment  Oral Residue: None  Initiation of Swallow: No impairment  Laryngeal Elevation: Functional  Aspiration Signs/Symptoms: None  Pharyngeal Phase Characteristics: No impairment, issues, or problems      Oral Phase Severity: Minimal  Pharyngeal Phase Severity : No impairment  Voice:  Vocal Quality: No impairment     Pain:  Pain Scale 1: Numeric (0 - 10)  Pain Intensity 1: 3         After treatment:   Patient left in no apparent distress in bed, Call bell within reach and Nursing notified    COMMUNICATION/EDUCATION:   Patient receptive of education regarding diet recs, swallow strategies and s/s aspiration. The patient's plan of care including recommendations, planned interventions, and recommended diet changes were discussed with: Registered nurse.      Thank you for this referral.  Ryan Wilhelm M.S., M.Ed., CCC-SLP  Time Calculation: 13 mins

## 2020-11-15 NOTE — CONSULTS
Infectious Disease Consult Note    Reason for Consult:  Rash   Date of Consultation: November 15, 2020  Date of Admission: 2020  Referring Physician:  Hospitalist     HPI:  53-year-old  female admitted on  with a diffuse erythematous rash thought to be from an allergic reaction. ID consulted for treatment recommendations. Her history is significant for chronic pain syndrome, hypothyroidism, seasonal allergy, and hyperlipidemia. She received seasonal influenza vaccine and Toradol through [facility] on 11/10 following which noticed a diffuse erythematous rash. Pt claims she received a influenza vaccine years ago but doesnt recall experiencing a similar reaction. She was started carbamazepine 3 wks ago for pain control but has not noticed any untoward reaction, has been on all other home meds for a while. She reports pruritus and lip swelling, denies throat swelling/pain. In the ED, received a dose of Epinephrine and Benadryl has been on IV steroid, scheduled benadryl and ibuprofen w/o noticeable improvement. She has been afebrile w a normal WBC on routine labs. Pt has been teary and very anxious today per staff.      Review of Systems:     Gen: Negative for chills, fevers, weight loss, weight gain   CV:  Negative for chest pain, dyspnea on exertion, leg edema   Lungs: Negative for shortness of breath, cough, wheezing   Abdomen: Negative for abdominal pain, nausea, vomiting   Genitourinary: Negative for genital pain or genital discharge     Skin:  Diffuse erythematous rash    Musculoskeletal: Negative for joint pain, joint swelling, joint erythema    Psych: emotionally labile       Past Medical History:  Past Medical History:   Diagnosis Date    Chronic migraine without aura     GERD (gastroesophageal reflux disease)     High cholesterol     Thyroid disease        Past surgical history   Past Surgical History:   Procedure Laterality Date    HX  SECTION      twice    HX CHOLECYSTECTOMY      HX HERNIA REPAIR      HX ORTHOPAEDIC          Allergies: Allergies   Allergen Reactions    Sulfa (Sulfonamide Antibiotics) Hives         Medications:  No current facility-administered medications on file prior to encounter. Current Outpatient Medications on File Prior to Encounter   Medication Sig Dispense Refill    omeprazole (PRILOSEC) 40 mg capsule Take 40 mg by mouth daily.  ergocalciferol (Vitamin D2) 1,250 mcg (50,000 unit) capsule Take 50,000 Units by mouth.  carBAMazepine (TEGretol) 100 mg chewable tablet Take 100 mg by mouth three (3) times daily.  cetirizine (ZyrTEC) 10 mg tablet Take 1 Tab by mouth daily. 20 Tab 0    levothyroxine (SYNTHROID) 25 mcg tablet Take 1 Tab by mouth daily (before breakfast). After six  Refills to be done by Dr. Jessica Rivers 30 Tab 6    simvastatin (ZOCOR) 20 mg tablet Take 20 mg by mouth nightly.  hydrochlorothiazide (HYDRODIURIL) 25 mg tablet Take 12.5 mg by mouth daily.  amitriptyline (ELAVIL) 25 mg tablet Take 25 mg by mouth nightly as needed.  amitriptyline (ELAVIL) 100 mg tablet Take 100 mg by mouth daily.  eletriptan (RELPAX) 40 mg tablet Take 40 mg by mouth as needed.  may repeat in 2 hours if necessary         Physical Exam:    Vitals:   Patient Vitals for the past 24 hrs:   Temp Pulse Resp BP SpO2   11/15/20 1532 98.3 °F (36.8 °C) 72 18 (!) 102/52 96 %   11/15/20 1019     96 %   11/15/20 0829 98.6 °F (37 °C) 75 18 118/61 96 %   11/15/20 0051 98.3 °F (36.8 °C) 76 18 119/62 96 %   11/14/20 2049     97 %   11/14/20 2008 99.8 °F (37.7 °C) 84 18 117/61 97 %   ·   · GEN: NAD, AAO x 4  · HEENT: NCAT, PERRLA, lip swelling, no blisters in oral cavity   · HEART: S1, S2+, RRR, No murmur   · Lungs: CTA B/l, no wheeze/rhonchi   · Abdomen: soft, ND, NT, +BS   · Genitourinary: no genital discharge, no delgado  · Extremities: no edema  · Skin: Diffuse patchy erythematous rash involving face, exts, palmar surfaces of hands and trunk, no pustules or blisters appreciated     Labs:   Recent Labs     11/15/20  1100   WBC 5.6   HGB 11.7   HCT 36.7        Recent Labs     11/15/20  1100   BUN 12   CREA 0.85       Assessment      1. Diffuse erythematous rash involving truck, boykin surfaces of hands, exts and face: Likely drug reaction (Severe)       Received influenza vaccine and Toradol prior to the onset of symptoms      Lip swelling w/o significant involvement of oral mucosa, no blisters or bullous lesions appreciated       Started on carbamazepine 3 wks ago hasnt noticed any side effects      Afebrile w a normal WBC on routine labs       No evidence of bacteria superinfection or indication for antimicrobial therapy    2. Emotional lability: Likely from high dose steroid therapy     3. Other chronic problems per HPI     PLAN/Recs     1. I doubt rash is from influenza vaccine, though pt feels very strongly it is       NSAID likely culprit, pt received Toradol on the same day as influenza vaccine prior to onset of rash       Rash a listed side effect of carbamazepine however pt has been on it for over 3 wks w/o any adverse reactions      Will d/c Ibuprofen, due to concerns or rash exacerbation      May not be able to pin point exact etiology of rash but will rec avoiding NSAIDs for now       2. Increase Solumedrol to 125 mg q 12 hours x 2 days then taper, discussed w Pharm D     3. Continue Pepcid, add Atarax, change benadryl to prn     4. Anxiolytics for emotional lability     5. I do not anticipate progression to rosalia rod syndrome or toxic epidermal necrolysis      6. Reassurance rendered to pt and  at bedside.      Mikaela Mesa MD     11/15/2020

## 2020-11-16 LAB
ALBUMIN SERPL-MCNC: 2.9 G/DL (ref 3.5–5)
ALBUMIN/GLOB SERPL: 0.8 {RATIO} (ref 1.1–2.2)
ALP SERPL-CCNC: 98 U/L (ref 45–117)
ALT SERPL-CCNC: 49 U/L (ref 12–78)
ANION GAP SERPL CALC-SCNC: 7 MMOL/L (ref 5–15)
AST SERPL W P-5'-P-CCNC: 22 U/L (ref 15–37)
BASOPHILS # BLD: 0.1 K/UL (ref 0–0.1)
BASOPHILS NFR BLD: 1 % (ref 0–1)
BILIRUB DIRECT SERPL-MCNC: <0.1 MG/DL (ref 0–0.2)
BILIRUB SERPL-MCNC: 0.3 MG/DL (ref 0.2–1)
BUN SERPL-MCNC: 13 MG/DL (ref 6–20)
BUN/CREAT SERPL: 20 (ref 12–20)
CA-I BLD-MCNC: 8.2 MG/DL (ref 8.5–10.1)
CHLORIDE SERPL-SCNC: 111 MMOL/L (ref 97–108)
CK SERPL-CCNC: 86 U/L (ref 26–192)
CO2 SERPL-SCNC: 25 MMOL/L (ref 21–32)
CREAT SERPL-MCNC: 0.64 MG/DL (ref 0.55–1.02)
DIFFERENTIAL METHOD BLD: ABNORMAL
EOSINOPHIL # BLD: 0.1 K/UL (ref 0–0.4)
EOSINOPHIL NFR BLD: 1 % (ref 0–7)
ERYTHROCYTE [DISTWIDTH] IN BLOOD BY AUTOMATED COUNT: 15.2 % (ref 11.5–14.5)
GLOBULIN SER CALC-MCNC: 3.6 G/DL (ref 2–4)
GLUCOSE SERPL-MCNC: 80 MG/DL (ref 65–100)
HCT VFR BLD AUTO: 36.5 % (ref 35–47)
HGB BLD-MCNC: 11.4 G/DL (ref 11.5–16)
IMM GRANULOCYTES # BLD AUTO: 0 K/UL (ref 0–0.04)
IMM GRANULOCYTES NFR BLD AUTO: 0 % (ref 0–0.5)
INR PPP: 1.1 (ref 0.9–1.1)
LYMPHOCYTES # BLD: 2.1 K/UL (ref 0.8–3.5)
LYMPHOCYTES NFR BLD: 27 % (ref 12–49)
MCH RBC QN AUTO: 27.2 PG (ref 26–34)
MCHC RBC AUTO-ENTMCNC: 31.2 G/DL (ref 30–36.5)
MCV RBC AUTO: 87.1 FL (ref 80–99)
MONOCYTES # BLD: 0.9 K/UL (ref 0–1)
MONOCYTES NFR BLD: 11 % (ref 5–13)
NEUTS SEG # BLD: 4.8 K/UL (ref 1.8–8)
NEUTS SEG NFR BLD: 60 % (ref 32–75)
PLATELET # BLD AUTO: 311 K/UL (ref 150–400)
PMV BLD AUTO: 10.2 FL (ref 8.9–12.9)
POTASSIUM SERPL-SCNC: 3.5 MMOL/L (ref 3.5–5.1)
PROT SERPL-MCNC: 6.5 G/DL (ref 6.4–8.2)
PROTHROMBIN TIME: 14.4 SEC (ref 11.9–14.7)
RBC # BLD AUTO: 4.19 M/UL (ref 3.8–5.2)
SODIUM SERPL-SCNC: 143 MMOL/L (ref 136–145)
TROPONIN I SERPL-MCNC: <0.05 NG/ML
WBC # BLD AUTO: 8 K/UL (ref 3.6–11)

## 2020-11-16 PROCEDURE — 80076 HEPATIC FUNCTION PANEL: CPT

## 2020-11-16 PROCEDURE — 74011000250 HC RX REV CODE- 250: Performed by: NURSE PRACTITIONER

## 2020-11-16 PROCEDURE — 82550 ASSAY OF CK (CPK): CPT

## 2020-11-16 PROCEDURE — 80048 BASIC METABOLIC PNL TOTAL CA: CPT

## 2020-11-16 PROCEDURE — 74011250637 HC RX REV CODE- 250/637: Performed by: INTERNAL MEDICINE

## 2020-11-16 PROCEDURE — 84484 ASSAY OF TROPONIN QUANT: CPT

## 2020-11-16 PROCEDURE — 74011250636 HC RX REV CODE- 250/636: Performed by: NURSE PRACTITIONER

## 2020-11-16 PROCEDURE — 65270000029 HC RM PRIVATE

## 2020-11-16 PROCEDURE — 85610 PROTHROMBIN TIME: CPT

## 2020-11-16 PROCEDURE — 36415 COLL VENOUS BLD VENIPUNCTURE: CPT

## 2020-11-16 PROCEDURE — 74011250637 HC RX REV CODE- 250/637: Performed by: NURSE PRACTITIONER

## 2020-11-16 PROCEDURE — 85025 COMPLETE CBC W/AUTO DIFF WBC: CPT

## 2020-11-16 RX ADMIN — SODIUM CHLORIDE 125 ML/HR: 9 INJECTION, SOLUTION INTRAVENOUS at 02:02

## 2020-11-16 RX ADMIN — PANTOPRAZOLE SODIUM 40 MG: 20 TABLET, DELAYED RELEASE ORAL at 09:31

## 2020-11-16 RX ADMIN — ALPRAZOLAM 0.5 MG: 0.5 TABLET ORAL at 21:17

## 2020-11-16 RX ADMIN — ATORVASTATIN CALCIUM 10 MG: 10 TABLET, FILM COATED ORAL at 21:17

## 2020-11-16 RX ADMIN — CALAMINE 8% AND ZINC OXIDE 8%: 160 LOTION TOPICAL at 09:32

## 2020-11-16 RX ADMIN — METHYLPREDNISOLONE SODIUM SUCCINATE 125 MG: 125 INJECTION, POWDER, FOR SOLUTION INTRAMUSCULAR; INTRAVENOUS at 21:17

## 2020-11-16 RX ADMIN — LIDOCAINE HYDROCHLORIDE 15 ML: 20 SOLUTION ORAL; TOPICAL at 05:44

## 2020-11-16 RX ADMIN — DIPHENHYDRAMINE HYDROCHLORIDE AND LIDOCAINE HYDROCHLORIDE AND ALUMINUM HYDROXIDE AND MAGNESIUM HYDRO 10 ML: KIT at 18:31

## 2020-11-16 RX ADMIN — CALAMINE 8% AND ZINC OXIDE 8%: 160 LOTION TOPICAL at 17:38

## 2020-11-16 RX ADMIN — HYDROCHLOROTHIAZIDE 12.5 MG: 25 TABLET ORAL at 09:31

## 2020-11-16 RX ADMIN — LEVOTHYROXINE SODIUM 25 MCG: 0.03 TABLET ORAL at 05:40

## 2020-11-16 RX ADMIN — ACETAMINOPHEN 650 MG: 325 TABLET, FILM COATED ORAL at 16:30

## 2020-11-16 RX ADMIN — ACETAMINOPHEN 650 MG: 325 TABLET, FILM COATED ORAL at 05:40

## 2020-11-16 RX ADMIN — SODIUM CHLORIDE 125 ML/HR: 9 INJECTION, SOLUTION INTRAVENOUS at 21:17

## 2020-11-16 RX ADMIN — FAMOTIDINE 20 MG: 10 INJECTION INTRAVENOUS at 21:17

## 2020-11-16 RX ADMIN — KETOTIFEN FUMARATE 1 DROP: 0.35 SOLUTION/ DROPS OPHTHALMIC at 23:12

## 2020-11-16 RX ADMIN — METHYLPREDNISOLONE SODIUM SUCCINATE 125 MG: 125 INJECTION, POWDER, FOR SOLUTION INTRAMUSCULAR; INTRAVENOUS at 09:31

## 2020-11-16 RX ADMIN — SODIUM CHLORIDE 125 ML/HR: 9 INJECTION, SOLUTION INTRAVENOUS at 13:31

## 2020-11-16 RX ADMIN — CALAMINE 8% AND ZINC OXIDE 8%: 160 LOTION TOPICAL at 13:31

## 2020-11-16 RX ADMIN — LIDOCAINE HYDROCHLORIDE 15 ML: 20 SOLUTION ORAL; TOPICAL at 21:17

## 2020-11-16 RX ADMIN — LIDOCAINE HYDROCHLORIDE 15 ML: 20 SOLUTION ORAL; TOPICAL at 17:37

## 2020-11-16 RX ADMIN — FAMOTIDINE 20 MG: 10 INJECTION INTRAVENOUS at 09:31

## 2020-11-16 RX ADMIN — CALAMINE 8% AND ZINC OXIDE 8%: 160 LOTION TOPICAL at 21:16

## 2020-11-16 RX ADMIN — ALPRAZOLAM 0.5 MG: 0.5 TABLET ORAL at 09:31

## 2020-11-16 NOTE — PROGRESS NOTES
Hospitalist Progress Note    Subjective:   Daily Progress Note: 11/16/2020 10:16 AM    Hospital Course: Juan Casanova is a 60 y.o. female who has PMH significant for chronic pain, hypothyroidism and seasonal allergies. She comes into the emergency room after an allergic reaction to flu shot she received on Wednesday. She started having a reaction the next morning with ulcers in her mouth, sore throat with edema, diffuse erythematous rash covering face arms trunk and legs. She went back to emergent outpatient care center was given prednisone and Atarax without relief. She comes to this emergency room with same findings diffuse whole-body rash with oral mucosal involvement, and associated symptoms of itching. Received 1 dose of epi and Benadryl in the ED without relief. Will admit to inpatient. Will start repeat dose of epi, continuous IV fluids, every 6 hour Benadryl IV, every 6 hours Solu-Medrol, scheduled ibuprofen, duo nebs, oxygen, full liquid diet and speech evaluation. Most likely by presentation not a reaction to flu vaccine but to eitherTegretol new medication she started 3 weeks ago or to the IV Tor[facility]  Infectious disease consulted. Ibuprofen discontinued due to worsening condition. IV steroids increased to 125 mg twice daily, Atarax added Xanax for anxiety and labile emotional state. Subjective: Follow-up examination of patient at the bedside. She continues to have diffuse disseminated raised erythematous rash but less red today. Discussed again drug induced allergy rather than anaphylactic reaction to the flu shot. And the treatment plan.   Current Facility-Administered Medications   Medication Dose Route Frequency    lidocaine (XYLOCAINE) 2 % viscous solution 15 mL  15 mL Mouth/Throat PRN    diphenhydrAMINE-zinc acetate 2%-0.1% (BENADRYL) cream   Topical Q6H PRN    ketotifen (ZADITOR) 0.025 % (0.035 %) ophthalmic solution 1 Drop  1 Drop Both Eyes BID    levothyroxine (SYNTHROID) tablet 25 mcg  25 mcg Oral ACB    hydroCHLOROthiazide (HYDRODIURIL) tablet 12.5 mg  12.5 mg Oral DAILY    albuterol-ipratropium (DUO-NEB) 2.5 MG-0.5 MG/3 ML  3 mL Nebulization Q6H PRN    methylPREDNISolone (PF) (SOLU-MEDROL) injection 125 mg  125 mg IntraVENous Q12H    ALPRAZolam (XANAX) tablet 0.5 mg  0.5 mg Oral BID    hydrOXYzine HCL (ATARAX) tablet 50 mg  50 mg Oral TID PRN    famotidine (PF) (PEPCID) 20 mg in 0.9% sodium chloride 10 mL injection  20 mg IntraVENous Q12H    calamine-zinc oxide (CALAMINE) 8-8 % solution   Topical QID    acetaminophen (TYLENOL) tablet 650 mg  650 mg Oral Q6H PRN    Or    acetaminophen (TYLENOL) suppository 650 mg  650 mg Rectal Q6H PRN    polyethylene glycol (MIRALAX) packet 17 g  17 g Oral DAILY PRN    bisacodyL (DULCOLAX) tablet 5 mg  5 mg Oral DAILY PRN    ondansetron (ZOFRAN ODT) tablet 4 mg  4 mg Oral Q6H PRN    Or    ondansetron (ZOFRAN) injection 4 mg  4 mg IntraVENous Q6H PRN    enoxaparin (LOVENOX) injection 40 mg  40 mg SubCUTAneous DAILY    0.9% sodium chloride infusion  125 mL/hr IntraVENous CONTINUOUS    pantoprazole (PROTONIX) tablet 40 mg  40 mg Oral DAILY    atorvastatin (LIPITOR) tablet 10 mg  10 mg Oral QHS        REVIEW OF SYSTEMS    Review of Systems   Skin: Positive for itching and rash. Diffuse disseminated raised hives/rash   Psychiatric/Behavioral: Positive for depression. The patient is nervous/anxious and has insomnia. Objective:     Visit Vitals  /65   Pulse 72   Temp 98.3 °F (36.8 °C)   Resp 18   Ht 5' 1\" (1.549 m)   Wt 103.1 kg (227 lb 4.7 oz)   SpO2 98%   BMI 42.95 kg/m²      O2 Device: Room air    Temp (24hrs), Av.3 °F (36.8 °C), Min:98 °F (36.7 °C), Max:98.7 °F (37.1 °C)      No intake/output data recorded. No intake/output data recorded. PHYSICAL EXAM:    Physical Exam  Constitutional:       Appearance: She is ill-appearing.    Eyes:      Extraocular Movements: Extraocular movements intact. Cardiovascular:      Rate and Rhythm: Normal rate. Pulmonary:      Effort: Pulmonary effort is normal.   Skin:     Findings: Erythema and rash present. Data Review    Recent Results (from the past 24 hour(s))   METABOLIC PANEL, BASIC    Collection Time: 11/15/20 11:00 AM   Result Value Ref Range    Sodium 138 136 - 145 mmol/L    Potassium 4.0 3.5 - 5.1 mmol/L    Chloride 108 97 - 108 mmol/L    CO2 24 21 - 32 mmol/L    Anion gap 6 5 - 15 mmol/L    Glucose 149 (H) 65 - 100 mg/dL    BUN 12 6 - 20 mg/dL    Creatinine 0.85 0.55 - 1.02 mg/dL    BUN/Creatinine ratio 14 12 - 20      GFR est AA >60 >60 ml/min/1.73m2    GFR est non-AA >60 >60 ml/min/1.73m2    Calcium 8.2 (L) 8.5 - 10.1 mg/dL   CBC W/O DIFF    Collection Time: 11/15/20 11:00 AM   Result Value Ref Range    WBC 5.6 3.6 - 11.0 K/uL    RBC 4.23 3.80 - 5.20 M/uL    HGB 11.7 11.5 - 16.0 g/dL    HCT 36.7 35.0 - 47.0 %    MCV 86.8 80.0 - 99.0 FL    MCH 27.7 26.0 - 34.0 PG    MCHC 31.9 30.0 - 36.5 g/dL    RDW 15.2 (H) 11.5 - 14.5 %    PLATELET 963 430 - 696 K/uL    MPV 10.0 8.9 - 12.9 FL       No orders to display       Active Problems:    Allergic reaction (11/14/2020)      Anaphylactic reaction due to vaccination (11/14/2020)      Hives (11/14/2020)      Angioedema (11/14/2020)      Itching (11/14/2020)      Drug allergy (11/15/2020)      Allergic angioedema (11/15/2020)        Assessment/Plan:     1. Drug-induced reaction:   2. Oral ulcerations and angioedema:  3. Diffuse rash/hives covering body:  · Repeat epi injection, patient thinks reaction is from flu shot  · Newly started on Tegretol 3 weeks ago, also received IV Toradol with flu shot  · IV steroids increased to 125 every 12 hours, calamine lotion, Zaditor ophthalmic  · continue IV fluids  · Oxygen not required, duo nebs as needed  · Speech therapy for evaluation, will start lidocaine rinse  · Regular diet    4. Hypothyroidism:  · Continue home medication    5. Anxiety/emotionally labile: Most likely secondary to illness plus steroids  · Xanax 0.5 twice daily scheduled, Atarax 50 mg 3 times daily as needed     DVT Prophylaxis: Lovenox  Code Status: Full  POA/NOK:   ___________________________________  Time spent in direct care including coordination of service, review of data and examination: > 35 minutes  Care Plan discussed with: Nurse, patient, consult, case management ______________________________________________________________________________    Guevara Verduzco NP    This is dictation was done by dragon, computer voice recognition software. Quite often unanticipated grammatical, syntax, homophones and other interpretive errors or inadvertently transcribed by the computer software. Please excuse errors that have escaped final proofreading. Thank you.

## 2020-11-16 NOTE — PROGRESS NOTES
Infectious Disease Progress Note               Subjective:   Pt seen and examined at bedside. Doing well, denies new complaints. No worsening of rash  Objective:   Physical Exam:     Visit Vitals  /65   Pulse 72   Temp 98.3 °F (36.8 °C)   Resp 18   Ht 5' 1\" (1.549 m)   Wt 227 lb 4.7 oz (103.1 kg)   SpO2 98%   BMI 42.95 kg/m²      O2 Device: Room air    Temp (24hrs), Av.3 °F (36.8 °C), Min:98 °F (36.7 °C), Max:98.7 °F (37.1 °C)    No intake/output data recorded. No intake/output data recorded. General: NAD, alert, AAO x 4  HEENT: SHANNON, Moist mucosa   Lungs: CTA b/l, decreased BS at the bases   Heart: S1S2+, RRR, no murmur  Abdo: Soft, NT, ND, +BS   Exts: No edema, + pulses b/l   Skin: Diffuse erythematous rash involving trunk, face and exts       Data Review:       Recent Days:  Recent Labs     11/15/20  1100   WBC 5.6   HGB 11.7   HCT 36.7        Recent Labs     11/15/20  1100   BUN 12   CREA 0.85       Microbiology: None     Diagnostics   CXR Results  (Last 48 hours)    None          Assessment/Plan     1.  Diffuse erythematous rash involving truck, boykin surfaces of hands, exts and face: Likely drug reaction (Severe)       Received influenza vaccine and Toradol prior to the onset of symptoms      Started on carbamazepine 3 wks ago hasnt noticed any side effects      No worsening of rash,      2. Emotional lability: Likely from high dose steroid therapy      3. Other chronic problems per HPI      Dakota Elder MD    2020

## 2020-11-16 NOTE — PROGRESS NOTES
Reason for Admission:   Anaphylaxis                   RUR Score:          10%           Plan for utilizing home health:      Patient has no prior home health. PCP: First and Last name:  Dr. Lakisha Dan   Name of Practice: [id number] in [place]   Are you a current patient: Yes/No: yes   Approximate date of last visit: x2 months ago   Can you participate in a virtual visit with your PCP:                     Current Advanced Directive/Advance Care Plan: Patient does not have a POA in place. Patient is .  is Owen Ruby [phone]. Transition of Care Plan:                    CM met with patient at the bedside for DCP assessment. Patient lives with her  in a 1 floor house. Patient does not have any DME and is independent with ADLs. No prior home health. Patient does not anticipate she will need help at discharge. Anticipate home/self care.

## 2020-11-17 LAB
ANION GAP SERPL CALC-SCNC: 5 MMOL/L (ref 5–15)
BASOPHILS # BLD: 0 K/UL (ref 0–0.1)
BASOPHILS NFR BLD: 0 % (ref 0–1)
BUN SERPL-MCNC: 13 MG/DL (ref 6–20)
BUN/CREAT SERPL: 18 (ref 12–20)
CA-I BLD-MCNC: 8.1 MG/DL (ref 8.5–10.1)
CHLORIDE SERPL-SCNC: 109 MMOL/L (ref 97–108)
CO2 SERPL-SCNC: 27 MMOL/L (ref 21–32)
CREAT SERPL-MCNC: 0.73 MG/DL (ref 0.55–1.02)
DIFFERENTIAL METHOD BLD: ABNORMAL
EBV EA IGG SER-ACNC: <9 U/ML
EOSINOPHIL # BLD: 0 K/UL (ref 0–0.4)
EOSINOPHIL NFR BLD: 0 % (ref 0–7)
ERYTHROCYTE [DISTWIDTH] IN BLOOD BY AUTOMATED COUNT: 15.2 % (ref 11.5–14.5)
GLUCOSE SERPL-MCNC: 100 MG/DL (ref 65–100)
HCT VFR BLD AUTO: 39.2 % (ref 35–47)
HGB BLD-MCNC: 12.3 G/DL (ref 11.5–16)
IMM GRANULOCYTES # BLD AUTO: 0 K/UL (ref 0–0.04)
IMM GRANULOCYTES NFR BLD AUTO: 0 % (ref 0–0.5)
LYMPHOCYTES # BLD: 1.7 K/UL (ref 0.8–3.5)
LYMPHOCYTES NFR BLD: 21 % (ref 12–49)
MCH RBC QN AUTO: 27.4 PG (ref 26–34)
MCHC RBC AUTO-ENTMCNC: 31.4 G/DL (ref 30–36.5)
MCV RBC AUTO: 87.3 FL (ref 80–99)
MONOCYTES # BLD: 0.7 K/UL (ref 0–1)
MONOCYTES NFR BLD: 9 % (ref 5–13)
NEUTS SEG # BLD: 5.6 K/UL (ref 1.8–8)
NEUTS SEG NFR BLD: 70 % (ref 32–75)
PLATELET # BLD AUTO: 340 K/UL (ref 150–400)
PMV BLD AUTO: 9.8 FL (ref 8.9–12.9)
POTASSIUM SERPL-SCNC: 3.5 MMOL/L (ref 3.5–5.1)
RBC # BLD AUTO: 4.49 M/UL (ref 3.8–5.2)
SODIUM SERPL-SCNC: 141 MMOL/L (ref 136–145)
WBC # BLD AUTO: 8 K/UL (ref 3.6–11)

## 2020-11-17 PROCEDURE — 36415 COLL VENOUS BLD VENIPUNCTURE: CPT

## 2020-11-17 PROCEDURE — 74011000250 HC RX REV CODE- 250: Performed by: NURSE PRACTITIONER

## 2020-11-17 PROCEDURE — 88305 TISSUE EXAM BY PATHOLOGIST: CPT

## 2020-11-17 PROCEDURE — 65270000029 HC RM PRIVATE

## 2020-11-17 PROCEDURE — 74011250637 HC RX REV CODE- 250/637: Performed by: NURSE PRACTITIONER

## 2020-11-17 PROCEDURE — 74011250637 HC RX REV CODE- 250/637: Performed by: INTERNAL MEDICINE

## 2020-11-17 PROCEDURE — 85025 COMPLETE CBC W/AUTO DIFF WBC: CPT

## 2020-11-17 PROCEDURE — 74011250636 HC RX REV CODE- 250/636: Performed by: NURSE PRACTITIONER

## 2020-11-17 PROCEDURE — 80048 BASIC METABOLIC PNL TOTAL CA: CPT

## 2020-11-17 PROCEDURE — 99232 SBSQ HOSP IP/OBS MODERATE 35: CPT | Performed by: INTERNAL MEDICINE

## 2020-11-17 RX ORDER — HYDROCODONE BITARTRATE AND ACETAMINOPHEN 5; 325 MG/1; MG/1
1 TABLET ORAL
Status: DISCONTINUED | OUTPATIENT
Start: 2020-11-17 | End: 2020-11-19 | Stop reason: HOSPADM

## 2020-11-17 RX ADMIN — PANTOPRAZOLE SODIUM 40 MG: 20 TABLET, DELAYED RELEASE ORAL at 08:47

## 2020-11-17 RX ADMIN — ALPRAZOLAM 0.5 MG: 0.5 TABLET ORAL at 08:47

## 2020-11-17 RX ADMIN — ACETAMINOPHEN 650 MG: 325 TABLET, FILM COATED ORAL at 08:47

## 2020-11-17 RX ADMIN — KETOTIFEN FUMARATE 1 DROP: 0.35 SOLUTION/ DROPS OPHTHALMIC at 08:49

## 2020-11-17 RX ADMIN — CALAMINE 8% AND ZINC OXIDE 8%: 160 LOTION TOPICAL at 17:01

## 2020-11-17 RX ADMIN — DIPHENHYDRAMINE HYDROCHLORIDE AND LIDOCAINE HYDROCHLORIDE AND ALUMINUM HYDROXIDE AND MAGNESIUM HYDRO 10 ML: KIT at 08:24

## 2020-11-17 RX ADMIN — LEVOTHYROXINE SODIUM 25 MCG: 0.03 TABLET ORAL at 05:41

## 2020-11-17 RX ADMIN — FAMOTIDINE 20 MG: 10 INJECTION INTRAVENOUS at 20:47

## 2020-11-17 RX ADMIN — ALPRAZOLAM 0.5 MG: 0.5 TABLET ORAL at 20:47

## 2020-11-17 RX ADMIN — FAMOTIDINE 20 MG: 10 INJECTION INTRAVENOUS at 09:00

## 2020-11-17 RX ADMIN — LIDOCAINE HYDROCHLORIDE 15 ML: 20 SOLUTION ORAL; TOPICAL at 05:41

## 2020-11-17 RX ADMIN — HYDROCODONE BITARTRATE AND ACETAMINOPHEN 1 TABLET: 5; 325 TABLET ORAL at 20:47

## 2020-11-17 RX ADMIN — DIPHENHYDRAMINE HYDROCHLORIDE AND LIDOCAINE HYDROCHLORIDE AND ALUMINUM HYDROXIDE AND MAGNESIUM HYDRO 10 ML: KIT at 16:00

## 2020-11-17 RX ADMIN — CALAMINE 8% AND ZINC OXIDE 8%: 160 LOTION TOPICAL at 12:21

## 2020-11-17 RX ADMIN — CALAMINE 8% AND ZINC OXIDE 8%: 160 LOTION TOPICAL at 20:48

## 2020-11-17 RX ADMIN — CALAMINE 8% AND ZINC OXIDE 8%: 160 LOTION TOPICAL at 08:50

## 2020-11-17 RX ADMIN — SODIUM CHLORIDE 125 ML/HR: 9 INJECTION, SOLUTION INTRAVENOUS at 05:41

## 2020-11-17 RX ADMIN — SODIUM CHLORIDE 125 ML/HR: 9 INJECTION, SOLUTION INTRAVENOUS at 21:02

## 2020-11-17 RX ADMIN — HYDROXYZINE HYDROCHLORIDE 50 MG: 25 TABLET, FILM COATED ORAL at 16:04

## 2020-11-17 RX ADMIN — ATORVASTATIN CALCIUM 10 MG: 10 TABLET, FILM COATED ORAL at 20:47

## 2020-11-17 RX ADMIN — DIPHENHYDRAMINE HYDROCHLORIDE AND LIDOCAINE HYDROCHLORIDE AND ALUMINUM HYDROXIDE AND MAGNESIUM HYDRO 10 ML: KIT at 12:20

## 2020-11-17 RX ADMIN — METHYLPREDNISOLONE SODIUM SUCCINATE 125 MG: 125 INJECTION, POWDER, FOR SOLUTION INTRAMUSCULAR; INTRAVENOUS at 08:47

## 2020-11-17 RX ADMIN — KETOTIFEN FUMARATE 1 DROP: 0.35 SOLUTION/ DROPS OPHTHALMIC at 20:50

## 2020-11-17 NOTE — PROGRESS NOTES
Hospitalist Progress Note             Daily Progress Note: 11/17/2020      Subjective: The patient is seen for follow  up. The patient is a 59 y/o female with a PMHx of hypothyroidism and seasonal allergies who presented to the ED on 11/16/2020 with new onset of diffuse urticaria. It appeared to be an allergic reaction, as she received the influenza vaccine and Toradol last Wednesday on 11/11/2020. Associated symptoms included pruritis, edema, diffuse erythematous rash covering face, arms, trunk, legs, hands, and feet. She was given epinephrine and IV steroids  in ED and was admitted for further management. Infectious disease was consulted and she was IV steroids were increased to 125 mg BID. The patient was seen and examined at bedside. She is in mild distress  Reports rash is not improving  Reports pins and needles sensation of back and feet  No acute events overnight, airway is stable. Problem List:  Patient Active Problem List   Diagnosis Code    Allergic reaction T78.40XA    Anaphylactic reaction due to vaccination T80.52XA    Hives L50.9    Angioedema T78. 3XXA    Itching L29.9    Drug allergy Z88.9    Allergic angioedema T78. 3XXA         Medications reviewed  Current Facility-Administered Medications   Medication Dose Route Frequency    HYDROcodone-acetaminophen (NORCO) 5-325 mg per tablet 1 Tab  1 Tab Oral Q6H PRN    magic mouthwash (FIRST-MOUTHWASH BLM) oral suspension 10 mL  10 mL Oral TIDAC    lidocaine (XYLOCAINE) 2 % viscous solution 15 mL  15 mL Mouth/Throat PRN    diphenhydrAMINE-zinc acetate 2%-0.1% (BENADRYL) cream   Topical Q6H PRN    ketotifen (ZADITOR) 0.025 % (0.035 %) ophthalmic solution 1 Drop  1 Drop Both Eyes BID    levothyroxine (SYNTHROID) tablet 25 mcg  25 mcg Oral ACB    hydroCHLOROthiazide (HYDRODIURIL) tablet 12.5 mg  12.5 mg Oral DAILY    albuterol-ipratropium (DUO-NEB) 2.5 MG-0.5 MG/3 ML  3 mL Nebulization Q6H PRN    methylPREDNISolone (PF) (SOLU-MEDROL) injection 125 mg  125 mg IntraVENous Q12H    ALPRAZolam (XANAX) tablet 0.5 mg  0.5 mg Oral BID    hydrOXYzine HCL (ATARAX) tablet 50 mg  50 mg Oral TID PRN    famotidine (PF) (PEPCID) 20 mg in 0.9% sodium chloride 10 mL injection  20 mg IntraVENous Q12H    calamine-zinc oxide (CALAMINE) 8-8 % solution   Topical QID    acetaminophen (TYLENOL) tablet 650 mg  650 mg Oral Q6H PRN    Or    acetaminophen (TYLENOL) suppository 650 mg  650 mg Rectal Q6H PRN    polyethylene glycol (MIRALAX) packet 17 g  17 g Oral DAILY PRN    bisacodyL (DULCOLAX) tablet 5 mg  5 mg Oral DAILY PRN    ondansetron (ZOFRAN ODT) tablet 4 mg  4 mg Oral Q6H PRN    Or    ondansetron (ZOFRAN) injection 4 mg  4 mg IntraVENous Q6H PRN    enoxaparin (LOVENOX) injection 40 mg  40 mg SubCUTAneous DAILY    0.9% sodium chloride infusion  125 mL/hr IntraVENous CONTINUOUS    pantoprazole (PROTONIX) tablet 40 mg  40 mg Oral DAILY    atorvastatin (LIPITOR) tablet 10 mg  10 mg Oral QHS       Review of Systems:   Review of Systems   Constitutional: Negative for chills, fever and malaise/fatigue. HENT: Negative for ear pain, hearing loss and sore throat. Eyes: Negative for blurred vision, pain, discharge and redness. Respiratory: Negative for cough, hemoptysis, shortness of breath and wheezing. Cardiovascular: Negative for chest pain, palpitations, orthopnea and leg swelling. Gastrointestinal: Negative for abdominal pain, constipation, diarrhea, nausea and vomiting. Genitourinary: Negative for dysuria, frequency, hematuria and urgency. Musculoskeletal: Negative for back pain, joint pain, myalgias and neck pain. Skin: Positive for itching and rash. Neurological: Positive for tingling. Negative for dizziness, weakness and headaches. Objective:   Physical Exam  Constitutional:       General: She is not in acute distress. Appearance: Normal appearance.  She is not toxic-appearing or diaphoretic. HENT:      Head: Normocephalic and atraumatic. Nose: Nose normal.      Mouth/Throat:      Mouth: Mucous membranes are moist.      Pharynx: Posterior oropharyngeal erythema present. Comments: Blisters present in mouth  Eyes:      General:         Right eye: No discharge. Left eye: No discharge. Extraocular Movements: Extraocular movements intact. Conjunctiva/sclera: Conjunctivae normal.      Pupils: Pupils are equal, round, and reactive to light. Cardiovascular:      Rate and Rhythm: Normal rate and regular rhythm. Pulses: Normal pulses. Heart sounds: Normal heart sounds. Pulmonary:      Effort: Pulmonary effort is normal. No respiratory distress. Breath sounds: Normal breath sounds. Abdominal:      General: Bowel sounds are normal. There is no distension. Palpations: Abdomen is soft. There is no mass. Tenderness: There is no abdominal tenderness. Skin:     General: Skin is warm and dry. Findings: Erythema and rash present. Comments: Diffuse erythematous urticarial rash from scalp to feet, including hands. Negative for blistering or ulceration. Neurological:      General: No focal deficit present. Mental Status: She is alert and oriented to person, place, and time. Motor: No weakness.    Psychiatric:         Mood and Affect: Mood normal.         Behavior: Behavior normal.          Visit Vitals  /73   Pulse 65   Temp 97.9 °F (36.6 °C)   Resp 18   Ht 5' 1\" (1.549 m)   Wt 102.7 kg (226 lb 6.6 oz)   SpO2 99%   BMI 42.78 kg/m²         Data Review:       Recent Days:  Recent Labs     11/17/20  0810 11/16/20  1226 11/15/20  1100   WBC 8.0 8.0 5.6   HGB 12.3 11.4* 11.7   HCT 39.2 36.5 36.7    311 304     Recent Labs     11/17/20  0810 11/16/20  1226 11/15/20  1100    143 138   K 3.5 3.5 4.0   * 111* 108   CO2 27 25 24    80 149*   BUN 13 13 12   CREA 0.73 0.64 0.85   CA 8.1* 8.2* 8.2*   ALB  -- 2.9*  --    TBILI  --  0.3  --    ALT  --  49  --    INR  --  1.1  --        Assessment/Plan     1. Severe Allergic Reaction  Possible allergic reaction to influenza vaccine or toradol IM from 11/11/2020, or new medication Tegretol that she was taking for 3 weeks. She is on IV solu-medrol 125 mg BID   Reports rash is not improving   Will get a punch biopsy and consult with ID for possible initiation of antibiotics  Continue IV solu--medrol 125 BID, calamine lotion, Zaditor ophthalmic    2. Hypothyroidism  Continue levothyroxine 125 mcg daily    3. Anxiety/Emotional Lability  Likely secondary to illness and steroids   Continue Xanax 0.5 twice daily scheduled, Atarax 50 mg 3 times daily as needed    Lovenox for DVT Prophylaxis  Full code    Comments: Diffuse worsening urticaria that is unresponsive to solu-medrol. Skin biopsy ordered today. Will consult with ID for possible initiation of antibiotics.     Poornima Johns NP

## 2020-11-17 NOTE — PROGRESS NOTES
REPOR RECEIVED FROM THE LAB THIS AFTERNOON ABOUT THE PUNCH SKIN BIOPSY THAT I SEND DOWN TO THEM, STATED THEY DID NOT KNOW WHAT TO DO WITH IT.     I SPOKE WITH THE NURSING SUPERVISOR, DAY SHIFT) Concepción Zurita RN, WHO LEFT A MESSAGE FOR THE LAB SUPERVISORY TO RETURN HER CALL REGARD THIS PUNCH BX. THEY WILL HANDLE THIS SITUATION FROM A SUPERVISORY POINT OF VIEW

## 2020-11-17 NOTE — PROGRESS NOTES
Hospitalist Progress Note             Daily Progress Note: 11/17/2020      Subjective: The patient is seen for follow  up. The patient is a 59 y/o female with a PMHx of hypothyroidism and seasonal allergies who presented to the ED on 11/16/2020 with new onset of diffuse urticaria. It appeared to be an allergic reaction, as she received the influenza vaccine and Toradol last Wednesday on 11/11/2020. Associated symptoms included pruritis, edema, diffuse erythematous rash covering face, arms, trunk, legs, hands, and feet. She was given epinephrine and IV steroids  in ED and was admitted for further management. Infectious disease was consulted and she was IV steroids were increased to 125 mg BID. The patient was seen and examined at bedside. She is in mild distress  Reports rash is not improving  Reports pins and needles sensation of back and feet  No acute events overnight, airway is stable. Problem List:  Patient Active Problem List   Diagnosis Code    Allergic reaction T78.40XA    Anaphylactic reaction due to vaccination T80.52XA    Hives L50.9    Angioedema T78. 3XXA    Itching L29.9    Drug allergy Z88.9    Allergic angioedema T78. 3XXA         Medications reviewed  Current Facility-Administered Medications   Medication Dose Route Frequency    magic mouthwash (FIRST-MOUTHWASH BLM) oral suspension 10 mL  10 mL Oral TIDAC    lidocaine (XYLOCAINE) 2 % viscous solution 15 mL  15 mL Mouth/Throat PRN    diphenhydrAMINE-zinc acetate 2%-0.1% (BENADRYL) cream   Topical Q6H PRN    ketotifen (ZADITOR) 0.025 % (0.035 %) ophthalmic solution 1 Drop  1 Drop Both Eyes BID    levothyroxine (SYNTHROID) tablet 25 mcg  25 mcg Oral ACB    hydroCHLOROthiazide (HYDRODIURIL) tablet 12.5 mg  12.5 mg Oral DAILY    albuterol-ipratropium (DUO-NEB) 2.5 MG-0.5 MG/3 ML  3 mL Nebulization Q6H PRN    methylPREDNISolone (PF) (SOLU-MEDROL) injection 125 mg  125 mg IntraVENous Q12H    ALPRAZolam (XANAX) tablet 0.5 mg  0.5 mg Oral BID    hydrOXYzine HCL (ATARAX) tablet 50 mg  50 mg Oral TID PRN    famotidine (PF) (PEPCID) 20 mg in 0.9% sodium chloride 10 mL injection  20 mg IntraVENous Q12H    calamine-zinc oxide (CALAMINE) 8-8 % solution   Topical QID    acetaminophen (TYLENOL) tablet 650 mg  650 mg Oral Q6H PRN    Or    acetaminophen (TYLENOL) suppository 650 mg  650 mg Rectal Q6H PRN    polyethylene glycol (MIRALAX) packet 17 g  17 g Oral DAILY PRN    bisacodyL (DULCOLAX) tablet 5 mg  5 mg Oral DAILY PRN    ondansetron (ZOFRAN ODT) tablet 4 mg  4 mg Oral Q6H PRN    Or    ondansetron (ZOFRAN) injection 4 mg  4 mg IntraVENous Q6H PRN    enoxaparin (LOVENOX) injection 40 mg  40 mg SubCUTAneous DAILY    0.9% sodium chloride infusion  125 mL/hr IntraVENous CONTINUOUS    pantoprazole (PROTONIX) tablet 40 mg  40 mg Oral DAILY    atorvastatin (LIPITOR) tablet 10 mg  10 mg Oral QHS       Review of Systems:   Review of Systems   Constitutional: Negative for chills, fever and malaise/fatigue. HENT: Negative for ear pain, hearing loss and sore throat. Eyes: Negative for blurred vision, pain, discharge and redness. Respiratory: Negative for cough, hemoptysis, shortness of breath and wheezing. Cardiovascular: Negative for chest pain, palpitations, orthopnea and leg swelling. Gastrointestinal: Negative for abdominal pain, constipation, diarrhea, nausea and vomiting. Genitourinary: Negative for dysuria, frequency, hematuria and urgency. Musculoskeletal: Negative for back pain, joint pain, myalgias and neck pain. Skin: Positive for itching and rash. Neurological: Positive for tingling. Negative for dizziness, weakness and headaches. Objective:   Physical Exam  Constitutional:       General: She is not in acute distress. Appearance: Normal appearance. She is not toxic-appearing or diaphoretic. HENT:      Head: Normocephalic and atraumatic.       Nose: Nose normal. Mouth/Throat:      Mouth: Mucous membranes are moist.      Pharynx: Posterior oropharyngeal erythema present. Comments: Blisters present in mouth  Eyes:      General:         Right eye: No discharge. Left eye: No discharge. Extraocular Movements: Extraocular movements intact. Conjunctiva/sclera: Conjunctivae normal.      Pupils: Pupils are equal, round, and reactive to light. Cardiovascular:      Rate and Rhythm: Normal rate and regular rhythm. Pulses: Normal pulses. Heart sounds: Normal heart sounds. Pulmonary:      Effort: Pulmonary effort is normal. No respiratory distress. Breath sounds: Normal breath sounds. Abdominal:      General: Bowel sounds are normal. There is no distension. Palpations: Abdomen is soft. There is no mass. Tenderness: There is no abdominal tenderness. Skin:     General: Skin is warm and dry. Findings: Erythema and rash present. Comments: Diffuse erythematous urticarial rash from scalp to feet, including hands. Negative for blistering or ulceration. Neurological:      General: No focal deficit present. Mental Status: She is alert and oriented to person, place, and time. Motor: No weakness.    Psychiatric:         Mood and Affect: Mood normal.         Behavior: Behavior normal.          Visit Vitals  /73   Pulse 65   Temp 97.9 °F (36.6 °C)   Resp 18   Ht 5' 1\" (1.549 m)   Wt 226 lb 6.6 oz (102.7 kg)   SpO2 99%   BMI 42.78 kg/m²         Data Review:       Recent Days:  Recent Labs     11/17/20  0810 11/16/20  1226 11/15/20  1100   WBC 8.0 8.0 5.6   HGB 12.3 11.4* 11.7   HCT 39.2 36.5 36.7    311 304     Recent Labs     11/17/20  0810 11/16/20  1226 11/15/20  1100    143 138   K 3.5 3.5 4.0   * 111* 108   CO2 27 25 24    80 149*   BUN 13 13 12   CREA 0.73 0.64 0.85   CA 8.1* 8.2* 8.2*   ALB  --  2.9*  --    TBILI  --  0.3  --    ALT  --  49  --    INR  --  1.1  -- Assessment/Plan     1. Severe Allergic Reaction  Possible allergic reaction to influenza vaccine or toradol IM from 11/11/2020, or new medication Tegretol that she was taking for 3 weeks. She is on IV solu-medrol 125 mg BID   Reports rash is not improving   Will get a punch biopsy and consult with ID for possible initiation of antibiotics  Continue IV solu--medrol 125 BID, calamine lotion, Zaditor ophthalmic    2. Hypothyroidism  Continue levothyroxine 125 mcg daily    3. Anxiety/Emotional Lability  Likely secondary to illness and steroids   Continue Xanax 0.5 twice daily scheduled, Atarax 50 mg 3 times daily as needed    Lovenox for DVT Prophylaxis  Full code    Comments: Diffuse worsening urticaria that is unresponsive to solu-medrol. Skin biopsy ordered today. Will consult with ID for possible initiation of antibiotics.     Eder AMADOR

## 2020-11-18 LAB
ANION GAP SERPL CALC-SCNC: 7 MMOL/L (ref 5–15)
BASOPHILS # BLD: 0 K/UL (ref 0–0.1)
BASOPHILS NFR BLD: 1 % (ref 0–1)
BUN SERPL-MCNC: 15 MG/DL (ref 6–20)
BUN/CREAT SERPL: 21 (ref 12–20)
CA-I BLD-MCNC: 7.9 MG/DL (ref 8.5–10.1)
CHLORIDE SERPL-SCNC: 110 MMOL/L (ref 97–108)
CO2 SERPL-SCNC: 26 MMOL/L (ref 21–32)
CREAT SERPL-MCNC: 0.73 MG/DL (ref 0.55–1.02)
DIFFERENTIAL METHOD BLD: ABNORMAL
EOSINOPHIL # BLD: 0.2 K/UL (ref 0–0.4)
EOSINOPHIL NFR BLD: 2 % (ref 0–7)
ERYTHROCYTE [DISTWIDTH] IN BLOOD BY AUTOMATED COUNT: 15 % (ref 11.5–14.5)
GLUCOSE SERPL-MCNC: 71 MG/DL (ref 65–100)
HCT VFR BLD AUTO: 37.8 % (ref 35–47)
HGB BLD-MCNC: 11.6 G/DL (ref 11.5–16)
IMM GRANULOCYTES # BLD AUTO: 0.1 K/UL (ref 0–0.04)
IMM GRANULOCYTES NFR BLD AUTO: 1 % (ref 0–0.5)
LYMPHOCYTES # BLD: 2.8 K/UL (ref 0.8–3.5)
LYMPHOCYTES NFR BLD: 34 % (ref 12–49)
MCH RBC QN AUTO: 26.8 PG (ref 26–34)
MCHC RBC AUTO-ENTMCNC: 30.7 G/DL (ref 30–36.5)
MCV RBC AUTO: 87.3 FL (ref 80–99)
MONOCYTES # BLD: 0.8 K/UL (ref 0–1)
MONOCYTES NFR BLD: 9 % (ref 5–13)
NEUTS SEG # BLD: 4.6 K/UL (ref 1.8–8)
NEUTS SEG NFR BLD: 53 % (ref 32–75)
PLATELET # BLD AUTO: 348 K/UL (ref 150–400)
PMV BLD AUTO: 9.8 FL (ref 8.9–12.9)
POTASSIUM SERPL-SCNC: 3.1 MMOL/L (ref 3.5–5.1)
RBC # BLD AUTO: 4.33 M/UL (ref 3.8–5.2)
SODIUM SERPL-SCNC: 143 MMOL/L (ref 136–145)
WBC # BLD AUTO: 8.5 K/UL (ref 3.6–11)

## 2020-11-18 PROCEDURE — 80048 BASIC METABOLIC PNL TOTAL CA: CPT

## 2020-11-18 PROCEDURE — 74011000250 HC RX REV CODE- 250: Performed by: NURSE PRACTITIONER

## 2020-11-18 PROCEDURE — 74011250637 HC RX REV CODE- 250/637: Performed by: NURSE PRACTITIONER

## 2020-11-18 PROCEDURE — 74011250636 HC RX REV CODE- 250/636: Performed by: NURSE PRACTITIONER

## 2020-11-18 PROCEDURE — 65270000029 HC RM PRIVATE

## 2020-11-18 PROCEDURE — 36415 COLL VENOUS BLD VENIPUNCTURE: CPT

## 2020-11-18 PROCEDURE — 74011250636 HC RX REV CODE- 250/636: Performed by: INTERNAL MEDICINE

## 2020-11-18 PROCEDURE — 74011250637 HC RX REV CODE- 250/637: Performed by: INTERNAL MEDICINE

## 2020-11-18 PROCEDURE — 85025 COMPLETE CBC W/AUTO DIFF WBC: CPT

## 2020-11-18 PROCEDURE — 99232 SBSQ HOSP IP/OBS MODERATE 35: CPT | Performed by: INTERNAL MEDICINE

## 2020-11-18 RX ORDER — POTASSIUM CHLORIDE 750 MG/1
40 TABLET, FILM COATED, EXTENDED RELEASE ORAL
Status: COMPLETED | OUTPATIENT
Start: 2020-11-18 | End: 2020-11-18

## 2020-11-18 RX ADMIN — HYDROCHLOROTHIAZIDE 12.5 MG: 25 TABLET ORAL at 11:53

## 2020-11-18 RX ADMIN — CALAMINE 8% AND ZINC OXIDE 8%: 160 LOTION TOPICAL at 22:28

## 2020-11-18 RX ADMIN — LIDOCAINE HYDROCHLORIDE 15 ML: 20 SOLUTION ORAL; TOPICAL at 04:15

## 2020-11-18 RX ADMIN — FAMOTIDINE 20 MG: 10 INJECTION INTRAVENOUS at 11:54

## 2020-11-18 RX ADMIN — KETOTIFEN FUMARATE 1 DROP: 0.35 SOLUTION/ DROPS OPHTHALMIC at 12:00

## 2020-11-18 RX ADMIN — ATORVASTATIN CALCIUM 10 MG: 10 TABLET, FILM COATED ORAL at 22:29

## 2020-11-18 RX ADMIN — METHYLPREDNISOLONE SODIUM SUCCINATE 40 MG: 125 INJECTION, POWDER, FOR SOLUTION INTRAMUSCULAR; INTRAVENOUS at 22:29

## 2020-11-18 RX ADMIN — HYDROXYZINE HYDROCHLORIDE 50 MG: 25 TABLET, FILM COATED ORAL at 18:37

## 2020-11-18 RX ADMIN — ACETAMINOPHEN 650 MG: 325 TABLET, FILM COATED ORAL at 04:15

## 2020-11-18 RX ADMIN — ALPRAZOLAM 0.5 MG: 0.5 TABLET ORAL at 22:29

## 2020-11-18 RX ADMIN — CALAMINE 8% AND ZINC OXIDE 8%: 160 LOTION TOPICAL at 18:31

## 2020-11-18 RX ADMIN — DIPHENHYDRAMINE HYDROCHLORIDE AND LIDOCAINE HYDROCHLORIDE AND ALUMINUM HYDROXIDE AND MAGNESIUM HYDRO 10 ML: KIT at 16:58

## 2020-11-18 RX ADMIN — FAMOTIDINE 20 MG: 10 INJECTION INTRAVENOUS at 22:29

## 2020-11-18 RX ADMIN — HYDROXYZINE HYDROCHLORIDE 50 MG: 25 TABLET, FILM COATED ORAL at 04:28

## 2020-11-18 RX ADMIN — DIPHENHYDRAMINE HYDROCHLORIDE AND LIDOCAINE HYDROCHLORIDE AND ALUMINUM HYDROXIDE AND MAGNESIUM HYDRO 10 ML: KIT at 07:32

## 2020-11-18 RX ADMIN — DIPHENHYDRAMINE HYDROCHLORIDE AND LIDOCAINE HYDROCHLORIDE AND ALUMINUM HYDROXIDE AND MAGNESIUM HYDRO 10 ML: KIT at 11:30

## 2020-11-18 RX ADMIN — ALPRAZOLAM 0.5 MG: 0.5 TABLET ORAL at 11:54

## 2020-11-18 RX ADMIN — PANTOPRAZOLE SODIUM 40 MG: 20 TABLET, DELAYED RELEASE ORAL at 11:53

## 2020-11-18 RX ADMIN — POTASSIUM CHLORIDE 40 MEQ: 750 TABLET, FILM COATED, EXTENDED RELEASE ORAL at 11:54

## 2020-11-18 RX ADMIN — LEVOTHYROXINE SODIUM 25 MCG: 0.03 TABLET ORAL at 04:15

## 2020-11-18 RX ADMIN — METHYLPREDNISOLONE SODIUM SUCCINATE 40 MG: 125 INJECTION, POWDER, FOR SOLUTION INTRAMUSCULAR; INTRAVENOUS at 07:32

## 2020-11-18 RX ADMIN — SODIUM CHLORIDE 125 ML/HR: 9 INJECTION, SOLUTION INTRAVENOUS at 04:15

## 2020-11-18 RX ADMIN — CALAMINE 8% AND ZINC OXIDE 8%: 160 LOTION TOPICAL at 12:00

## 2020-11-18 NOTE — PROGRESS NOTES
Skin Assessment; Alert and oriented patient whose skin had no moisture damage however; she is covered in hives posteriorly and anteriorly secondary to medication she received. We are painting her skin with Calamine lotion as the hives begin to dry up. She is able to eat and swallow food. Maintaining a good airway. Skin biopsy done 11/17 2020 posterior neck, band aid over quaze remains intact. Saline lock to the right forearm #20, patently infusing fluids.

## 2020-11-18 NOTE — PROGRESS NOTES
Infectious Disease Progress Note           Subjective:   Doing well, no worsening of rash. Reports dry skin and lips. Oral pain is better. No acute events since last seen   Objective:   Physical Exam:     Visit Vitals  /70 (BP 1 Location: Left arm, BP Patient Position: At rest)   Pulse 70   Temp 98.4 °F (36.9 °C)   Resp 16   Ht 5' 1\" (1.549 m)   Wt 102.5 kg (225 lb 15.5 oz)   SpO2 98%   BMI 42.70 kg/m²      O2 Device: Room air    Temp (24hrs), Av.4 °F (36.9 °C), Min:98.4 °F (36.9 °C), Max:98.4 °F (36.9 °C)    No intake/output data recorded.  1901 -  0700  In: 7989 [P.O.:360; I.V.:1394]  Out: -     General: NAD, alert, AAO x 4  HEENT: SHANNON, Moist mucosa, no new oral lesions   Lungs: CTA b/l, decreased BS at the bases   Heart: S1S2+, RRR, no murmur  Abdo: Soft, NT, ND, +BS   Exts: No edema, + pulses b/l   Skin: Diffuse erythematous rash involving trunk, face and exts       Data Review:       Recent Days:  Recent Labs     20  0550 20  0810 20  1226   WBC 8.5 8.0 8.0   HGB 11.6 12.3 11.4*   HCT 37.8 39.2 36.5    340 311     Recent Labs     20  0550 20  0810 20  1226   BUN 15 13 13   CREA 0.73 0.73 0.64       Microbiology: None     Diagnostics   CXR Results  (Last 48 hours)    None          Assessment/Plan     1. Diffuse erythematous rash involving truck, boykin surfaces of hands, exts and face: Likely drug reaction (Severe)       Still w diffuse erythema in involved areas, no worsening of rash       Continue on IV steroid and Benadryl       Still no evidence of bacteria superinfection       Continue to monitor off antibiotics       2. Oral pain likely from # 1: Continue magic mouth wash as ordered       3. Dry skin: Start Eucerin Cream     4.  D/c IVF, tolerating oral intake     Kristi Ojeda MD    2020

## 2020-11-18 NOTE — PROGRESS NOTES
Hospitalist Progress Note             Daily Progress Note: 11/18/2020    Subjective: The patient is seen for follow  up. Patient is a 59 yo female with a PMH of hypothyroidism and seasonal allergies who was admitted on 11/14/2020 with severe urticaria. It appeared to be an allergic reaction, as she received the influenza vaccine and Toradol last Wednesday on 11/11/2020. Associated symptoms included pruritis, edema, diffuse erythematous rash covering face, arms, trunk, legs, hands, and feet. She was given epinephrine and IV steroids  in ED and was admitted for further management. She reports her skin is  Now peeling off and rash is drying up. She is in no acute distress. Reports rash is improving slowly  No signs of blistering skin     Problem List:  Patient Active Problem List   Diagnosis Code    Allergic reaction T78.40XA    Anaphylactic reaction due to vaccination T80.52XA    Hives L50.9    Angioedema T78. 3XXA    Itching L29.9    Drug allergy Z88.9    Allergic angioedema T78. 3XXA         Medications reviewed  Current Facility-Administered Medications   Medication Dose Route Frequency    potassium chloride SR (KLOR-CON 10) tablet 40 mEq  40 mEq Oral NOW    HYDROcodone-acetaminophen (NORCO) 5-325 mg per tablet 1 Tab  1 Tab Oral Q6H PRN    methylPREDNISolone (PF) (Solu-MEDROL) injection 40 mg  40 mg IntraVENous Q12H    magic mouthwash (FIRST-MOUTHWASH BLM) oral suspension 10 mL  10 mL Oral TIDAC    lidocaine (XYLOCAINE) 2 % viscous solution 15 mL  15 mL Mouth/Throat PRN    diphenhydrAMINE-zinc acetate 2%-0.1% (BENADRYL) cream   Topical Q6H PRN    ketotifen (ZADITOR) 0.025 % (0.035 %) ophthalmic solution 1 Drop  1 Drop Both Eyes BID    levothyroxine (SYNTHROID) tablet 25 mcg  25 mcg Oral ACB    hydroCHLOROthiazide (HYDRODIURIL) tablet 12.5 mg  12.5 mg Oral DAILY    albuterol-ipratropium (DUO-NEB) 2.5 MG-0.5 MG/3 ML  3 mL Nebulization Q6H PRN    ALPRAZolam Ning Pile) tablet 0.5 mg  0.5 mg Oral BID    hydrOXYzine HCL (ATARAX) tablet 50 mg  50 mg Oral TID PRN    famotidine (PF) (PEPCID) 20 mg in 0.9% sodium chloride 10 mL injection  20 mg IntraVENous Q12H    calamine-zinc oxide (CALAMINE) 8-8 % solution   Topical QID    acetaminophen (TYLENOL) tablet 650 mg  650 mg Oral Q6H PRN    Or    acetaminophen (TYLENOL) suppository 650 mg  650 mg Rectal Q6H PRN    polyethylene glycol (MIRALAX) packet 17 g  17 g Oral DAILY PRN    bisacodyL (DULCOLAX) tablet 5 mg  5 mg Oral DAILY PRN    ondansetron (ZOFRAN ODT) tablet 4 mg  4 mg Oral Q6H PRN    Or    ondansetron (ZOFRAN) injection 4 mg  4 mg IntraVENous Q6H PRN    enoxaparin (LOVENOX) injection 40 mg  40 mg SubCUTAneous DAILY    0.9% sodium chloride infusion  125 mL/hr IntraVENous CONTINUOUS    pantoprazole (PROTONIX) tablet 40 mg  40 mg Oral DAILY    atorvastatin (LIPITOR) tablet 10 mg  10 mg Oral QHS       Review of Systems:   ROS  Review of Systems   Constitutional: Negative for chills, fever and malaise/fatigue. HENT: Negative for ear pain, hearing loss and sore throat. Eyes: Negative for blurred vision, pain, discharge and redness. Respiratory: Negative for cough, hemoptysis, shortness of breath and wheezing. Cardiovascular: Negative for chest pain, palpitations, orthopnea and leg swelling. Gastrointestinal: Negative for abdominal pain, constipation, diarrhea, nausea and vomiting. Genitourinary: Negative for dysuria, frequency, hematuria and urgency. Musculoskeletal: Negative for back pain, joint pain, myalgias and neck pain. Skin: Positive for  rash.    Neurological:  Negative for dizziness, weakness and headaches.                Objective:   Physical Exam     Visit Vitals  /70 (BP 1 Location: Left arm, BP Patient Position: At rest)   Pulse 70   Temp 98.4 °F (36.9 °C)   Resp 16   Ht 5' 1\" (1.549 m)   Wt 102.5 kg (225 lb 15.5 oz)   SpO2 98%   BMI 42.70 kg/m²     Physical Exam  Constitutional: General: She is not in acute distress. Appearance: Normal appearance. She is not toxic-appearing or diaphoretic. HENT:      Head: Normocephalic and atraumatic. Nose: Nose normal.      Mouth/Throat:      Mouth: Mucous membranes are moist.      Pharynx: Posterior oropharyngeal erythema present. Eyes:      General:         Right eye: No discharge. Left eye: No discharge. Extraocular Movements: Extraocular movements intact. Conjunctiva/sclera: Conjunctivae normal.      Pupils: Pupils are equal, round, and reactive to light. Cardiovascular:      Rate and Rhythm: Normal rate and regular rhythm. Pulses: Normal pulses. Heart sounds: Normal heart sounds. Pulmonary:      Effort: Pulmonary effort is normal. No respiratory distress. Breath sounds: Normal breath sounds. Abdominal:      General: Bowel sounds are normal. There is no distension. Palpations: Abdomen is soft. There is no mass. Tenderness: There is no abdominal tenderness. Skin:     General: Skin is warm and dry. Findings: Erythema and rash present. Comments: Diffuse erythematous urticarial rash from scalp to feet, including hands. Negative for blistering or ulceration. Neurological:      General: No focal deficit present. Mental Status: She is alert and oriented to person, place, and time. Motor: No weakness.    Psychiatric:         Mood and Affect: Mood normal.         Behavior: Behavior normal.     Data Review:       Recent Days:  Recent Labs     11/18/20  0550 11/17/20  0810 11/16/20  1226   WBC 8.5 8.0 8.0   HGB 11.6 12.3 11.4*   HCT 37.8 39.2 36.5    340 311     Recent Labs     11/18/20  0550 11/17/20  0810 11/16/20  1226    141 143   K 3.1* 3.5 3.5   * 109* 111*   CO2 26 27 25   GLU 71 100 80   BUN 15 13 13   CREA 0.73 0.73 0.64   CA 7.9* 8.1* 8.2*   ALB  --   --  2.9*   TBILI  --   --  0.3   ALT  --   --  49   INR  --   --  1.1     No results for input(s): PH, PCO2, PO2, HCO3, FIO2 in the last 72 hours. Assessment/Plan     1. Severe Allergic Reaction   Improving with no worsening rash  Secondary to allergic reaction to influenza vaccine or toradol IM from 11/11/2020, or new medication Tegretol that she was taking for 3 weeks. Continue is on IV solu-medrol tapering ( 40  Mg IV TID)  Punch biopsy was done on 11/17/2020 and results pending    2. Hypothyroidism  Stable  Continue Levothyroxin 125 mcg daily    3. Anxiety/Emotional lability  Calm at present  Likely due to illness and steroids  Continue Xanax 0.5 BID scheduled, Atarax 50 mg 3 times daily PRN    Lovenox for DVT prophylaxis  CBC, BMP in am     Comments: Diffuse worsening urticaria that is improving on solu-medrol so will continue ( Tapering dose). Skin biopsy was done on 11/17/2020 and results pending. Infectious diseases does not recommend antimicrobial therapy at this time.   Based on clinical progression anticipated discharge on Friday 11/20/2020    July Carr NP

## 2020-11-18 NOTE — PROGRESS NOTES
Hospitalist Progress Note             Daily Progress Note: 11/18/2020    Subjective: The patient is seen for follow  up. Patient is a 61 yo female with a PMH of hypothyroidism and seasonal allergies who has been admitted to the hospital with urticaria. She complains of peeling skin, and erythema on her face, specifically her cheeks, nose, and ears today. She states that she has been using Vaseline but continues to experience some skin discomfort with occasional bleeding onher lips. The patient was seen at bedside. She is in no acute distress. Reports rash is improving slowly  No signs of blistering skin     Problem List:  Patient Active Problem List   Diagnosis Code    Allergic reaction T78.40XA    Anaphylactic reaction due to vaccination T80.52XA    Hives L50.9    Angioedema T78. 3XXA    Itching L29.9    Drug allergy Z88.9    Allergic angioedema T78. 3XXA         Medications reviewed  Current Facility-Administered Medications   Medication Dose Route Frequency    HYDROcodone-acetaminophen (NORCO) 5-325 mg per tablet 1 Tab  1 Tab Oral Q6H PRN    methylPREDNISolone (PF) (Solu-MEDROL) injection 40 mg  40 mg IntraVENous Q12H    magic mouthwash (FIRST-MOUTHWASH BLM) oral suspension 10 mL  10 mL Oral TIDAC    lidocaine (XYLOCAINE) 2 % viscous solution 15 mL  15 mL Mouth/Throat PRN    diphenhydrAMINE-zinc acetate 2%-0.1% (BENADRYL) cream   Topical Q6H PRN    ketotifen (ZADITOR) 0.025 % (0.035 %) ophthalmic solution 1 Drop  1 Drop Both Eyes BID    levothyroxine (SYNTHROID) tablet 25 mcg  25 mcg Oral ACB    hydroCHLOROthiazide (HYDRODIURIL) tablet 12.5 mg  12.5 mg Oral DAILY    albuterol-ipratropium (DUO-NEB) 2.5 MG-0.5 MG/3 ML  3 mL Nebulization Q6H PRN    ALPRAZolam (XANAX) tablet 0.5 mg  0.5 mg Oral BID    hydrOXYzine HCL (ATARAX) tablet 50 mg  50 mg Oral TID PRN    famotidine (PF) (PEPCID) 20 mg in 0.9% sodium chloride 10 mL injection  20 mg IntraVENous Q12H  calamine-zinc oxide (CALAMINE) 8-8 % solution   Topical QID    acetaminophen (TYLENOL) tablet 650 mg  650 mg Oral Q6H PRN    Or    acetaminophen (TYLENOL) suppository 650 mg  650 mg Rectal Q6H PRN    polyethylene glycol (MIRALAX) packet 17 g  17 g Oral DAILY PRN    bisacodyL (DULCOLAX) tablet 5 mg  5 mg Oral DAILY PRN    ondansetron (ZOFRAN ODT) tablet 4 mg  4 mg Oral Q6H PRN    Or    ondansetron (ZOFRAN) injection 4 mg  4 mg IntraVENous Q6H PRN    enoxaparin (LOVENOX) injection 40 mg  40 mg SubCUTAneous DAILY    0.9% sodium chloride infusion  125 mL/hr IntraVENous CONTINUOUS    pantoprazole (PROTONIX) tablet 40 mg  40 mg Oral DAILY    atorvastatin (LIPITOR) tablet 10 mg  10 mg Oral QHS       Review of Systems:   ROS  Review of Systems   Constitutional: Negative for chills, fever and malaise/fatigue. HENT: Negative for ear pain, hearing loss and sore throat. Eyes: Negative for blurred vision, pain, discharge and redness. Respiratory: Negative for cough, hemoptysis, shortness of breath and wheezing. Cardiovascular: Negative for chest pain, palpitations, orthopnea and leg swelling. Gastrointestinal: Negative for abdominal pain, constipation, diarrhea, nausea and vomiting. Genitourinary: Negative for dysuria, frequency, hematuria and urgency. Musculoskeletal: Negative for back pain, joint pain, myalgias and neck pain. Skin: Positive for  rash. Neurological:  Negative for dizziness, weakness and headaches.                Objective:   Physical Exam     Visit Vitals  /70 (BP 1 Location: Left arm, BP Patient Position: At rest)   Pulse 70   Temp 98.4 °F (36.9 °C)   Resp 16   Ht 5' 1\" (1.549 m)   Wt 225 lb 15.5 oz (102.5 kg)   SpO2 98%   BMI 42.70 kg/m²     Physical Exam  Constitutional:       General: She is not in acute distress. Appearance: Normal appearance. She is not toxic-appearing or diaphoretic. HENT:      Head: Normocephalic and atraumatic.       Nose: Nose normal. Mouth/Throat:      Mouth: Mucous membranes are moist.      Pharynx: Posterior oropharyngeal erythema present. Eyes:      General:         Right eye: No discharge. Left eye: No discharge. Extraocular Movements: Extraocular movements intact. Conjunctiva/sclera: Conjunctivae normal.      Pupils: Pupils are equal, round, and reactive to light. Cardiovascular:      Rate and Rhythm: Normal rate and regular rhythm. Pulses: Normal pulses. Heart sounds: Normal heart sounds. Pulmonary:      Effort: Pulmonary effort is normal. No respiratory distress. Breath sounds: Normal breath sounds. Abdominal:      General: Bowel sounds are normal. There is no distension. Palpations: Abdomen is soft. There is no mass. Tenderness: There is no abdominal tenderness. Skin:     General: Skin is warm and dry. Findings: Erythema and rash present. Comments: Diffuse erythematous urticarial rash from scalp to feet, including hands. Negative for blistering or ulceration. Neurological:      General: No focal deficit present. Mental Status: She is alert and oriented to person, place, and time. Motor: No weakness. Psychiatric:         Mood and Affect: Mood normal.         Behavior: Behavior normal.     Data Review:       Recent Days:  Recent Labs     11/18/20  0550 11/17/20  0810 11/16/20  1226   WBC 8.5 8.0 8.0   HGB 11.6 12.3 11.4*   HCT 37.8 39.2 36.5    340 311     Recent Labs     11/18/20  0550 11/17/20  0810 11/16/20  1226    141 143   K 3.1* 3.5 3.5   * 109* 111*   CO2 26 27 25   GLU 71 100 80   BUN 15 13 13   CREA 0.73 0.73 0.64   CA 7.9* 8.1* 8.2*   ALB  --   --  2.9*   TBILI  --   --  0.3   ALT  --   --  49   INR  --   --  1.1     No results for input(s): PH, PCO2, PO2, HCO3, FIO2 in the last 72 hours. Assessment/Plan     1.  Severe Allergic Reaction   Improving with no worsening rash  Secondary to allergic reaction to influenza vaccine or toradol IM from 11/11/2020, or new medication Tegretol that she was taking for 3 weeks. Continue is on IV solu-medrol tapering ( 40  Mg IV TID)  Punch biopsy was done on 11/17/2020 and results pending    2. Hypothyroidism  Stable  Continue Levothyroxin 125 mcg daily    3. Anxiety/Emotional lability  Calm at present  Likely due to illness and steroids  Continue Xanax 0.5 BID scheduled, Atarax 50 mg 3 times daily PRN    Lovenox for DVT prophylaxis  CBC, BMP in am     Comments: Diffuse worsening urticaria that is improving on solu-medrol so will continue ( Tapering dose). Skin biopsy was done on 11/17/2020 and results pending. Infectious diseases does not recommend antimicrobial therapy at this time.   Based on clinical progression anticipated discharge on Friday 11/20/2020    Olvin Lucas

## 2020-11-18 NOTE — PROGRESS NOTES
Infectious Disease Progress Note           Subjective:   Pt seen and examined at bedside. No worsening of rash. Punch biopsy done by Dr Lamar Echeverria today. Objective:   Physical Exam:     Visit Vitals  /73   Pulse 65   Temp 97.9 °F (36.6 °C)   Resp 18   Ht 5' 1\" (1.549 m)   Wt 102.7 kg (226 lb 6.6 oz)   SpO2 99%   BMI 42.78 kg/m²      O2 Device: Room air    Temp (24hrs), Av °F (36.7 °C), Min:97.9 °F (36.6 °C), Max:98 °F (36.7 °C)    No intake/output data recorded. No intake/output data recorded. General: NAD, alert, AAO x 4  HEENT: SHANNON, Moist mucosa, no new oral lesions   Lungs: CTA b/l, decreased BS at the bases   Heart: S1S2+, RRR, no murmur  Abdo: Soft, NT, ND, +BS   Exts: No edema, + pulses b/l   Skin: Diffuse erythematous rash involving trunk, face and exts       Data Review:       Recent Days:  Recent Labs     20  0810 20  1226 11/15/20  1100   WBC 8.0 8.0 5.6   HGB 12.3 11.4* 11.7   HCT 39.2 36.5 36.7    311 304     Recent Labs     20  0810 20  1226 11/15/20  1100   BUN 13 13 12   CREA 0.73 0.64 0.85       Microbiology: None     Diagnostics   CXR Results  (Last 48 hours)    None          Assessment/Plan     1. Diffuse erythematous rash involving truck, boykin surfaces of hands, exts and face: Likely drug reaction (Severe)       Still w diffuse erythema in involved areas, no worsening of rash       Decreased oral pain, tolerating oral intake      Remains afebrile w a normal WBC on routine labs       Continue on IV steroid, taper, no indication for antimicrobial therapy       2. Emotional lability: Likely from high dose steroid therapy        Continue anxiolytics as ordered     3.  Oral pain likely from # 1: Continue magic mouth wash as ordered       Lucina Mccormack MD    2020

## 2020-11-19 VITALS
OXYGEN SATURATION: 96 % | DIASTOLIC BLOOD PRESSURE: 60 MMHG | RESPIRATION RATE: 18 BRPM | WEIGHT: 225.97 LBS | TEMPERATURE: 98.2 F | HEIGHT: 61 IN | SYSTOLIC BLOOD PRESSURE: 124 MMHG | HEART RATE: 74 BPM | BODY MASS INDEX: 42.66 KG/M2

## 2020-11-19 LAB
ANION GAP SERPL CALC-SCNC: 6 MMOL/L (ref 5–15)
BUN SERPL-MCNC: 13 MG/DL (ref 6–20)
BUN/CREAT SERPL: 17 (ref 12–20)
CA-I BLD-MCNC: 8.2 MG/DL (ref 8.5–10.1)
CHLORIDE SERPL-SCNC: 106 MMOL/L (ref 97–108)
CMV IGG SERPL IA-ACNC: >10 U/ML
CO2 SERPL-SCNC: 29 MMOL/L (ref 21–32)
CREAT SERPL-MCNC: 0.77 MG/DL (ref 0.55–1.02)
GLUCOSE SERPL-MCNC: 123 MG/DL (ref 65–100)
POTASSIUM SERPL-SCNC: 4 MMOL/L (ref 3.5–5.1)
SODIUM SERPL-SCNC: 141 MMOL/L (ref 136–145)

## 2020-11-19 PROCEDURE — 74011250637 HC RX REV CODE- 250/637: Performed by: INTERNAL MEDICINE

## 2020-11-19 PROCEDURE — 80048 BASIC METABOLIC PNL TOTAL CA: CPT

## 2020-11-19 PROCEDURE — 74011250636 HC RX REV CODE- 250/636: Performed by: NURSE PRACTITIONER

## 2020-11-19 PROCEDURE — 74011250636 HC RX REV CODE- 250/636: Performed by: INTERNAL MEDICINE

## 2020-11-19 PROCEDURE — 36415 COLL VENOUS BLD VENIPUNCTURE: CPT

## 2020-11-19 PROCEDURE — 99231 SBSQ HOSP IP/OBS SF/LOW 25: CPT | Performed by: INTERNAL MEDICINE

## 2020-11-19 PROCEDURE — 74011250637 HC RX REV CODE- 250/637: Performed by: NURSE PRACTITIONER

## 2020-11-19 RX ORDER — PREDNISONE 10 MG/1
TABLET ORAL
Qty: 12 TAB | Refills: 0 | Status: SHIPPED | OUTPATIENT
Start: 2020-11-19 | End: 2020-11-19 | Stop reason: SDUPTHER

## 2020-11-19 RX ORDER — PREDNISONE 10 MG/1
TABLET ORAL
Qty: 12 TAB | Refills: 0 | Status: ON HOLD | OUTPATIENT
Start: 2020-11-19 | End: 2021-12-02

## 2020-11-19 RX ADMIN — CALAMINE 8% AND ZINC OXIDE 8%: 160 LOTION TOPICAL at 07:55

## 2020-11-19 RX ADMIN — ALPRAZOLAM 0.5 MG: 0.5 TABLET ORAL at 07:49

## 2020-11-19 RX ADMIN — PANTOPRAZOLE SODIUM 40 MG: 20 TABLET, DELAYED RELEASE ORAL at 07:49

## 2020-11-19 RX ADMIN — METHYLPREDNISOLONE SODIUM SUCCINATE 40 MG: 125 INJECTION, POWDER, FOR SOLUTION INTRAMUSCULAR; INTRAVENOUS at 07:48

## 2020-11-19 RX ADMIN — LEVOTHYROXINE SODIUM 25 MCG: 0.03 TABLET ORAL at 07:49

## 2020-11-19 RX ADMIN — HYDROCHLOROTHIAZIDE 12.5 MG: 25 TABLET ORAL at 07:48

## 2020-11-19 RX ADMIN — DIPHENHYDRAMINE HYDROCHLORIDE AND LIDOCAINE HYDROCHLORIDE AND ALUMINUM HYDROXIDE AND MAGNESIUM HYDRO 10 ML: KIT at 11:43

## 2020-11-19 NOTE — DISCHARGE SUMMARY
Hospitalist         Discharge Summary Note: 11/19/2020      Subjective:     Patient is a 61 yo female with a PMH of hypothyroidism and seasonal allergies who was admitted on 11/14/2020 with severe urticaria. It appeared to be an allergic reaction as she received the influenza vaccine and Tordol last Wednesday on 11/11/2020. Associated symptoms included pruritis, edema, diffuse erythematous rash covering face, arms, trunk, legs, hands, and feet. She was given epinephrine and IV steroids in ED and was admitted for further inpatient management. Patient's condition progressed on IV solu-medrol. Patient's skin has been peeling off and was dry during her stay. She reported that her rash was improving while pruritus and edema have completely resolved. Punch biopsy was performed on 11/17/2020 and results are currently pending. Patient denies of any symptoms, blistering, or ulceration and reports that calamine lotion has helped. Patient reports she is feeling much better today and overall stable for discharge. Problem List:  Patient Active Problem List   Diagnosis Code    Allergic reaction T78.40XA    Anaphylactic reaction due to vaccination T80.52XA    Hives L50.9    Angioedema T78. 3XXA    Itching L29.9    Drug allergy Z88.9    Allergic angioedema T78. 3XXA         Medications reviewed  Current Facility-Administered Medications   Medication Dose Route Frequency    white petrolatum-mineral oiL (EUCERIN) cream   Topical PRN    HYDROcodone-acetaminophen (NORCO) 5-325 mg per tablet 1 Tab  1 Tab Oral Q6H PRN    magic mouthwash (FIRST-MOUTHWASH BLM) oral suspension 10 mL  10 mL Oral TIDAC    lidocaine (XYLOCAINE) 2 % viscous solution 15 mL  15 mL Mouth/Throat PRN    diphenhydrAMINE-zinc acetate 2%-0.1% (BENADRYL) cream   Topical Q6H PRN    ketotifen (ZADITOR) 0.025 % (0.035 %) ophthalmic solution 1 Drop  1 Drop Both Eyes BID    levothyroxine (SYNTHROID) tablet 25 mcg  25 mcg Oral ACB    hydroCHLOROthiazide (HYDRODIURIL) tablet 12.5 mg  12.5 mg Oral DAILY    albuterol-ipratropium (DUO-NEB) 2.5 MG-0.5 MG/3 ML  3 mL Nebulization Q6H PRN    ALPRAZolam (XANAX) tablet 0.5 mg  0.5 mg Oral BID    hydrOXYzine HCL (ATARAX) tablet 50 mg  50 mg Oral TID PRN    famotidine (PF) (PEPCID) 20 mg in 0.9% sodium chloride 10 mL injection  20 mg IntraVENous Q12H    calamine-zinc oxide (CALAMINE) 8-8 % solution   Topical QID    acetaminophen (TYLENOL) tablet 650 mg  650 mg Oral Q6H PRN    Or    acetaminophen (TYLENOL) suppository 650 mg  650 mg Rectal Q6H PRN    polyethylene glycol (MIRALAX) packet 17 g  17 g Oral DAILY PRN    bisacodyL (DULCOLAX) tablet 5 mg  5 mg Oral DAILY PRN    ondansetron (ZOFRAN ODT) tablet 4 mg  4 mg Oral Q6H PRN    Or    ondansetron (ZOFRAN) injection 4 mg  4 mg IntraVENous Q6H PRN    enoxaparin (LOVENOX) injection 40 mg  40 mg SubCUTAneous DAILY    pantoprazole (PROTONIX) tablet 40 mg  40 mg Oral DAILY    atorvastatin (LIPITOR) tablet 10 mg  10 mg Oral QHS       Review of Systems:   ROS    Review of Systems   Constitutional: Negative for chills, fever and malaise/fatigue. HENT: Negative for ear pain, hearing loss and sore throat.    Eyes: Negative for blurred vision, pain, discharge and redness. Respiratory: Negative for cough, hemoptysis, shortness of breath and wheezing.    Cardiovascular: Negative for chest pain, palpitations, orthopnea and leg swelling. Gastrointestinal: Negative for abdominal pain, constipation, diarrhea, nausea and vomiting. Genitourinary: Negative for dysuria, frequency, hematuria and urgency. Musculoskeletal: Negative for back pain, joint pain, myalgias and neck pain. Skin: Positive for  rash. Neurological:  Negative for dizziness, weakness and headaches.     Objective:   Physical Exam   Constitutional:       General: She is not in acute distress.     Appearance: Normal appearance. She is not toxic-appearing or diaphoretic.    HENT:    Head: Normocephalic and atraumatic.      Nose: Nose normal.      Mouth/Throat:      Mouth: Mucous membranes are moist.      Eyes:      General:         Right eye: No discharge.         Left eye: No discharge.      Extraocular Movements: Extraocular movements intact.      Conjunctiva/sclera: Conjunctivae normal.      Pupils: Pupils are equal, round, and reactive to light. Cardiovascular:      Rate and Rhythm: Normal rate and regular rhythm.      Pulses: Normal pulses.      Heart sounds: Normal heart sounds. Pulmonary:      Effort: Pulmonary effort is normal. No respiratory distress.      Breath sounds: Normal breath sounds. Abdominal:      General: Bowel sounds are normal. There is no distension.      Palpations: Abdomen is soft. There is no mass.      Tenderness: There is no abdominal tenderness. Skin:     General: Skin is warm and dry.      Findings: Erythema and rash improving     Comments: Diffuse erythematous urticarial rash from scalp to feet, including hands. Negative for blistering or ulceration.   Neurological:      General: No focal deficit present.      Mental Status: She is alert and oriented to person, place, and time.      Motor: No weakness. Psychiatric:         Mood and Affect: Mood normal.         Behavior: Behavior normal.     Visit Vitals  /60   Pulse 74   Temp 98.2 °F (36.8 °C)   Resp 18   Ht 5' 1\" (1.549 m)   Wt 102.5 kg (225 lb 15.5 oz)   SpO2 96%   BMI 42.70 kg/m²        Data Review:       Recent Days:  Recent Labs     11/18/20  0550 11/17/20  0810   WBC 8.5 8.0   HGB 11.6 12.3   HCT 37.8 39.2    340     Recent Labs     11/19/20  0708 11/18/20  0550 11/17/20  0810    143 141   K 4.0 3.1* 3.5    110* 109*   CO2 29 26 27   * 71 100   BUN 13 15 13   CREA 0.77 0.73 0.73   CA 8.2* 7.9* 8.1*             Assessment/Plan     1. Severe Allergic Reaction:   Rash improved significantly. Continue tapering Prednisone PO at home.  Continue calamine lotion as needed. Follow up with PCP in 3-5 days   Follow up with surgery for results of punch biopsy     2. Hypothyroidism  Continue home dose of Synthroid     3.  Anxiety/Emotional lability  Stable        Butch Altman NP

## 2020-11-19 NOTE — PROGRESS NOTES
Discharge plan of care/case management plan validated with provider discharge order. Patient, showing no signs of acute distress, walked off unit with her son. Patient had prescription and verbalized understanding of follow up appts.

## 2020-11-19 NOTE — PROGRESS NOTES
Problem: Falls - Risk of  Goal: *Absence of Falls  Description: Document Gagan Sherman Fall Risk and appropriate interventions in the flowsheet.   Outcome: Progressing Towards Goal  Note: Fall Risk Interventions:            Medication Interventions: Patient to call before getting OOB, Teach patient to arise slowly

## 2020-11-19 NOTE — DISCHARGE SUMMARY
Hospitalist         Discharge Summary Note: 11/19/2020      Subjective:     Patient is a 59 yo female with a PMH of hypothyroidism and seasonal allergies who was admitted on 11/14/2020 with severe urticaria. It appeared to be an allergic reaction as she received the influenza vaccine and Tordol last Wednesday on 11/11/2020. Associated symptoms included pruritis, edema, diffuse erythematous rash covering face, arms, trunk, legs, hands, and feet. She was given epinephrine and IV steroids in ED and was admitted for further inpatient management. Patient's condition progressed on IV solu-medrol. Patient's skin has been peeling off and was dry during her stay. She reported that her rash was improving while pruritus and edema have completely resolved. Punch biopsy was performed on 11/17/2020 and results are currently pending. Patient denies of any symptoms, blistering, or ulceration and reports that calamine lotion has helped. Patient reports she is feeling much better today. and overall stable for discharge    Problem List:  Patient Active Problem List   Diagnosis Code    Allergic reaction T78.40XA    Anaphylactic reaction due to vaccination T80.52XA    Hives L50.9    Angioedema T78. 3XXA    Itching L29.9    Drug allergy Z88.9    Allergic angioedema T78. 3XXA         Medications reviewed  Current Facility-Administered Medications   Medication Dose Route Frequency    white petrolatum-mineral oiL (EUCERIN) cream   Topical PRN    HYDROcodone-acetaminophen (NORCO) 5-325 mg per tablet 1 Tab  1 Tab Oral Q6H PRN    magic mouthwash (FIRST-MOUTHWASH BLM) oral suspension 10 mL  10 mL Oral TIDAC    lidocaine (XYLOCAINE) 2 % viscous solution 15 mL  15 mL Mouth/Throat PRN    diphenhydrAMINE-zinc acetate 2%-0.1% (BENADRYL) cream   Topical Q6H PRN    ketotifen (ZADITOR) 0.025 % (0.035 %) ophthalmic solution 1 Drop  1 Drop Both Eyes BID    levothyroxine (SYNTHROID) tablet 25 mcg  25 mcg Oral ACB    hydroCHLOROthiazide (HYDRODIURIL) tablet 12.5 mg  12.5 mg Oral DAILY    albuterol-ipratropium (DUO-NEB) 2.5 MG-0.5 MG/3 ML  3 mL Nebulization Q6H PRN    ALPRAZolam (XANAX) tablet 0.5 mg  0.5 mg Oral BID    hydrOXYzine HCL (ATARAX) tablet 50 mg  50 mg Oral TID PRN    famotidine (PF) (PEPCID) 20 mg in 0.9% sodium chloride 10 mL injection  20 mg IntraVENous Q12H    calamine-zinc oxide (CALAMINE) 8-8 % solution   Topical QID    acetaminophen (TYLENOL) tablet 650 mg  650 mg Oral Q6H PRN    Or    acetaminophen (TYLENOL) suppository 650 mg  650 mg Rectal Q6H PRN    polyethylene glycol (MIRALAX) packet 17 g  17 g Oral DAILY PRN    bisacodyL (DULCOLAX) tablet 5 mg  5 mg Oral DAILY PRN    ondansetron (ZOFRAN ODT) tablet 4 mg  4 mg Oral Q6H PRN    Or    ondansetron (ZOFRAN) injection 4 mg  4 mg IntraVENous Q6H PRN    enoxaparin (LOVENOX) injection 40 mg  40 mg SubCUTAneous DAILY    pantoprazole (PROTONIX) tablet 40 mg  40 mg Oral DAILY    atorvastatin (LIPITOR) tablet 10 mg  10 mg Oral QHS       Review of Systems:   ROS    Review of Systems   Constitutional: Negative for chills, fever and malaise/fatigue. HENT: Negative for ear pain, hearing loss and sore throat.    Eyes: Negative for blurred vision, pain, discharge and redness. Respiratory: Negative for cough, hemoptysis, shortness of breath and wheezing.    Cardiovascular: Negative for chest pain, palpitations, orthopnea and leg swelling. Gastrointestinal: Negative for abdominal pain, constipation, diarrhea, nausea and vomiting. Genitourinary: Negative for dysuria, frequency, hematuria and urgency. Musculoskeletal: Negative for back pain, joint pain, myalgias and neck pain. Skin: Positive for  rash. Neurological:  Negative for dizziness, weakness and headaches.     Objective:   Physical Exam   Constitutional:       General: She is not in acute distress.     Appearance: Normal appearance. She is not toxic-appearing or diaphoretic. HENT:      Head: Normocephalic and atraumatic.      Nose: Nose normal.      Mouth/Throat:      Mouth: Mucous membranes are moist.      Eyes:      General:         Right eye: No discharge.         Left eye: No discharge.      Extraocular Movements: Extraocular movements intact.      Conjunctiva/sclera: Conjunctivae normal.      Pupils: Pupils are equal, round, and reactive to light. Cardiovascular:      Rate and Rhythm: Normal rate and regular rhythm.      Pulses: Normal pulses.      Heart sounds: Normal heart sounds. Pulmonary:      Effort: Pulmonary effort is normal. No respiratory distress.      Breath sounds: Normal breath sounds. Abdominal:      General: Bowel sounds are normal. There is no distension.      Palpations: Abdomen is soft. There is no mass.      Tenderness: There is no abdominal tenderness. Skin:     General: Skin is warm and dry.      Findings: Erythema and rash improving     Comments: Diffuse erythematous urticarial rash from scalp to feet, including hands. Negative for blistering or ulceration.   Neurological:      General: No focal deficit present.      Mental Status: She is alert and oriented to person, place, and time.      Motor: No weakness.    Psychiatric:         Mood and Affect: Mood normal.         Behavior: Behavior normal.     Visit Vitals  /60   Pulse 74   Temp 98.2 °F (36.8 °C)   Resp 18   Ht 5' 1\" (1.549 m)   Wt 225 lb 15.5 oz (102.5 kg)   SpO2 96%   BMI 42.70 kg/m²        Data Review:       Recent Days:  Recent Labs     11/18/20  0550 11/17/20  0810 11/16/20  1226   WBC 8.5 8.0 8.0   HGB 11.6 12.3 11.4*   HCT 37.8 39.2 36.5    340 311     Recent Labs     11/19/20  0708 11/18/20  0550 11/17/20  0810 11/16/20  1226    143 141 143   K 4.0 3.1* 3.5 3.5    110* 109* 111*   CO2 29 26 27 25   * 71 100 80   BUN 13 15 13 13   CREA 0.77 0.73 0.73 0.64   CA 8.2* 7.9* 8.1* 8.2*   ALB  --   --   --  2.9*   TBILI  --   --   --  0.3   ALT  --   --   --  49 INR  --   --   --  1.1             Assessment/Plan     1. Severe Allergic Reaction:   Rash improved significantly. Continue tapering Prednisone PO at home. Continue calamine lotion as needed. Follow up with PCP in 3-5 days   Follow up with surgery for results of punch biopsy     2. Hypothyroidism  Continue home dose of Synthroid     3.  Anxiety/Emotional lability  Stable        Kaylen Thomas

## 2020-11-19 NOTE — PROGRESS NOTES
Infectious Disease Progress Note           Subjective:   Pt seen and examined at bedside. Doing well, denies new complaints. No acute events since last seen. Feels much better and will like to be discharged home today. Objective:   Physical Exam:     Visit Vitals  /60   Pulse 74   Temp 98.2 °F (36.8 °C)   Resp 18   Ht 5' 1\" (1.549 m)   Wt 102.5 kg (225 lb 15.5 oz)   SpO2 96%   BMI 42.70 kg/m²      O2 Device: Room air    Temp (24hrs), Av.6 °F (37 °C), Min:98.2 °F (36.8 °C), Max:98.9 °F (37.2 °C)    No intake/output data recorded.  1901 -  0700  In: 1754 [P.O.:360; I.V.:1394]  Out: -     General: NAD, alert, AAO x 4  HEENT: SHANNON, Moist mucosa, no new oral lesions   Lungs: CTA b/l, decreased BS at the bases   Heart: S1S2+, RRR, no murmur  Abdo: Soft, NT, ND, +BS   Exts: No edema, + pulses b/l   Skin: Diffuse erythematous rash involving trunk, face and exts       Data Review:       Recent Days:  Recent Labs     20  0550 20  0810   WBC 8.5 8.0   HGB 11.6 12.3   HCT 37.8 39.2    340     Recent Labs     20  0708 20  0550 20  0810   BUN 13 15 13   CREA 0.77 0.73 0.73       Microbiology: None     Diagnostics   CXR Results  (Last 48 hours)    None          Assessment/Plan     1. Diffuse erythematous rash involving truck, boykin surfaces of hands, exts and face: Likely drug reaction (Severe)       Suspected NSAID, doubt from influenza Vaccine or Carbamezapine       No worsening of rash, or evidence of bacteria superinfection       No indication for antimicrobial therapy       Taper steroid therapy. Routine labs in the morning      Advised to follow up w me as need upon d/c . Advised pt to avoid NSAIDS       2. Oral pain likely from # 1: Improved, tolerating oral in take       3. Dry skin: Continue Eucerin Cream  As needed     4.  D/c IVF, tolerating oral intake      Kamryn Key MD    2020

## 2020-11-19 NOTE — DISCHARGE INSTRUCTIONS
Patient Education        Allergic Reaction: Care Instructions  Your Care Instructions     An allergic reaction is an excessive response from your immune system to a medicine, chemical, food, insect bite, or other substance. A reaction can range from mild to life-threatening. Some people have a mild rash, hives, and itching or stomach cramps. In severe reactions, swelling of your tongue and throat can close up your airway so that you cannot breathe. Follow-up care is a key part of your treatment and safety. Be sure to make and go to all appointments, and call your doctor if you are having problems. It's also a good idea to know your test results and keep a list of the medicines you take. How can you care for yourself at home? · If you know what caused your allergic reaction, be sure to avoid it. Your allergy may become more severe each time you have a reaction. · Take an over-the-counter antihistamine, such as cetirizine (Zyrtec) or loratadine (Claritin), to treat mild symptoms. Read and follow directions on the label. Some antihistamines can make you feel sleepy. Do not give antihistamines to a child unless you have checked with your doctor first. Mild symptoms include sneezing or an itchy or runny nose; an itchy mouth; a few hives or mild itching; and mild nausea or stomach discomfort. · Do not scratch hives or a rash. Put a cold, moist towel on them or take cool baths to relieve itching. Put ice packs on hives, swelling, or insect stings for 10 to 15 minutes at a time. Put a thin cloth between the ice pack and your skin. Do not take hot baths or showers. They will make the itching worse. · Your doctor may prescribe a shot of epinephrine to carry with you in case you have a severe reaction. Learn how to give yourself the shot and keep it with you at all times. Make sure it is not .   · Go to the emergency room every time you have a severe reaction, even if you have used your shot of epinephrine and are feeling better. Symptoms can come back after a shot. · Wear medical alert jewelry that lists your allergies. You can buy this at most drugstores. · If your child has a severe allergy, make sure that his or her teachers, babysitters, coaches, and other caregivers know about the allergy. They should have an epinephrine shot, know how and when to give it, and have a plan to take your child to the hospital.  When should you call for help? Give an epinephrine shot if:    · You think you are having a severe allergic reaction.     · You have symptoms in more than one body area, such as mild nausea and an itchy mouth. After giving an epinephrine shot call 911, even if you feel better. Call 911 if:    · You have symptoms of a severe allergic reaction. These may include:  ? Sudden raised, red areas (hives) all over your body. ? Swelling of the throat, mouth, lips, or tongue. ? Trouble breathing. ? Passing out (losing consciousness). Or you may feel very lightheaded or suddenly feel weak, confused, or restless.     · You have been given an epinephrine shot, even if you feel better. Call your doctor now or seek immediate medical care if:    · You have symptoms of an allergic reaction, such as:  ? A rash or hives (raised, red areas on the skin). ? Itching. ? Swelling. ? Belly pain, nausea, or vomiting. Watch closely for changes in your health, and be sure to contact your doctor if:    · You do not get better as expected. Where can you learn more? Go to http://www.gray.com/  Enter Z713 in the search box to learn more about \"Allergic Reaction: Care Instructions. \"  Current as of: June 29, 2020               Content Version: 12.6  © 4493-7209 [employer]. Care instructions adapted under license by [employer] (which disclaims liability or warranty for this information).  If you have questions about a medical condition or this instruction, always ask your healthcare professional. [place] [address] any warranty or liability for your use of this information. Patient Education        Drug Allergy: Care Instructions  Your Care Instructions     A drug allergy occurs when your immune system overreacts to something in a medicine. This causes an allergic reaction. You may have skin problems, such as hives or a rash. Your lips, mouth, and throat may swell. You may have trouble breathing. And you may have belly pain, nausea, vomiting, or diarrhea. A reaction can range from mild to life-threatening. After you have an allergic reaction to a medicine, you may always be allergic to that medicine and to others like it. Drug allergies are different than side effects and drug interactions. Side effects are bad reactions to a medicine. Drug interactions occur when two or more medicines that you take do not get along in your body. Some people may confuse these with drug allergies. Follow-up care is a key part of your treatment and safety. Be sure to make and go to all appointments, and call your doctor if you are having problems. It's also a good idea to know your test results and keep a list of the medicines you take. How can you care for yourself at home? · Your doctor may prescribe a shot of epinephrine to carry with you in case you have a severe reaction. Learn how to give yourself the shot, and keep it with you at all times. Make sure it has not . · Go to the emergency room every time you have a severe reaction. Go even if you have used your shot of epinephrine and are feeling better. Symptoms can come back after a shot. · Be safe with medicines. If you were given a medicine for your allergic reaction, take it exactly as directed. Call your doctor if you think you are having a problem with your medicine. · Avoid medicines like the one that caused your allergy.  Ask your doctor or pharmacist if you think you may be taking a similar medicine. · If you have a mild skin rash or itching from your allergy:  ? Wear light clothing that does not irritate your skin. ? Use calamine lotion. Or take an over-the-counter antihistamine, such as diphenhydramine (Benadryl) or loratadine (Claritin). Read and follow the instructions on the label. ? Take a cool shower or bath. ? Do not use strong soaps, detergents, and other chemicals. They can make itching worse. · Wear medical alert jewelry that lists your allergies. You can buy this at most [employer]. · Be sure that anyone treating you for any health problem knows that you are allergic to this medicine. When should you call for help? Give an epinephrine shot if:    · You think you are having a severe allergic reaction. After giving an epinephrine shot call 911, even if you feel better. Call 911 if:    · You have symptoms of a severe allergic reaction. These may include:  ? Sudden raised, red areas (hives) all over your body. ? Swelling of the throat, mouth, lips, or tongue. ? Trouble breathing. ? Passing out (losing consciousness). Or you may feel very lightheaded or suddenly feel weak, confused, or restless.     · You have been given an epinephrine shot, even if you feel better. Call your doctor now or seek immediate medical care if:    · You have symptoms of an allergic reaction, such as:  ? A rash or hives (raised, red areas on the skin). ? Itching. ? Swelling. ? Belly pain, nausea, or vomiting. Watch closely for changes in your health, and be sure to contact your doctor if:    · You do not get better as expected. Where can you learn more? Go to http://www.gray.com/  Enter U411 in the search box to learn more about \"Drug Allergy: Care Instructions. \"  Current as of: June 29, 2020               Content Version: 12.6  © 0122-8036 [employer].    Care instructions adapted under license by [employer] (which disclaims liability or warranty for this information). If you have questions about a medical condition or this instruction, always ask your healthcare professional. [place] [address] any warranty or liability for your use of this information.

## 2021-10-22 ENCOUNTER — TRANSCRIBE ORDER (OUTPATIENT)
Dept: SCHEDULING | Age: 56
End: 2021-10-22

## 2021-10-22 DIAGNOSIS — R13.14 DYSPHAGIA, PHARYNGOESOPHAGEAL PHASE: Primary | ICD-10-CM

## 2021-10-22 DIAGNOSIS — R19.5 OCCULT BLOOD IN STOOLS: ICD-10-CM

## 2021-10-27 ENCOUNTER — HOSPITAL ENCOUNTER (OUTPATIENT)
Dept: GENERAL RADIOLOGY | Age: 56
Discharge: HOME | End: 2021-10-27
Attending: INTERNAL MEDICINE
Payer: COMMERCIAL

## 2021-10-27 DIAGNOSIS — R19.5 OCCULT BLOOD IN STOOLS: ICD-10-CM

## 2021-10-27 DIAGNOSIS — R13.14 DYSPHAGIA, PHARYNGOESOPHAGEAL PHASE: ICD-10-CM

## 2021-10-27 PROCEDURE — 74011000250 HC RX REV CODE- 250: Performed by: INTERNAL MEDICINE

## 2021-10-27 PROCEDURE — 74250 X-RAY XM SM INT 1CNTRST STD: CPT

## 2021-10-27 RX ADMIN — BARIUM SULFATE 355 ML: 0.6 SUSPENSION ORAL at 08:20

## 2021-11-29 ENCOUNTER — HOSPITAL ENCOUNTER (OUTPATIENT)
Dept: PREADMISSION TESTING | Age: 56
Discharge: HOME | End: 2021-11-29
Payer: COMMERCIAL

## 2021-11-29 PROCEDURE — U0005 INFEC AGEN DETEC AMPLI PROBE: HCPCS

## 2021-11-30 LAB
SARS-COV-2, XPLCVT: NOT DETECTED
SOURCE, COVRS: NORMAL

## 2021-12-02 ENCOUNTER — HOSPITAL ENCOUNTER (OUTPATIENT)
Age: 56
Setting detail: OUTPATIENT SURGERY
Discharge: HOME | End: 2021-12-02
Attending: SPECIALIST | Admitting: SPECIALIST
Payer: COMMERCIAL

## 2021-12-02 VITALS
SYSTOLIC BLOOD PRESSURE: 153 MMHG | OXYGEN SATURATION: 99 % | DIASTOLIC BLOOD PRESSURE: 87 MMHG | HEART RATE: 71 BPM | RESPIRATION RATE: 16 BRPM

## 2021-12-02 PROCEDURE — 74011000250 HC RX REV CODE- 250: Performed by: SPECIALIST

## 2021-12-02 PROCEDURE — 76040000007: Performed by: SPECIALIST

## 2021-12-02 PROCEDURE — 2709999900 HC NON-CHARGEABLE SUPPLY: Performed by: SPECIALIST

## 2021-12-02 RX ORDER — PANTOPRAZOLE SODIUM 40 MG/1
40 TABLET, DELAYED RELEASE ORAL DAILY
COMMUNITY

## 2021-12-02 RX ORDER — TROSPIUM CHLORIDE 20 MG/1
20 TABLET, FILM COATED ORAL
COMMUNITY

## 2021-12-02 RX ORDER — MELOXICAM 15 MG/1
15 TABLET ORAL DAILY
COMMUNITY

## 2021-12-02 RX ORDER — LIDOCAINE HYDROCHLORIDE 20 MG/ML
JELLY TOPICAL ONCE
Status: COMPLETED | OUTPATIENT
Start: 2021-12-02 | End: 2021-12-02

## 2021-12-02 RX ORDER — FAMOTIDINE 20 MG/1
20 TABLET, FILM COATED ORAL 2 TIMES DAILY
COMMUNITY

## 2021-12-02 RX ORDER — LEVOCETIRIZINE DIHYDROCHLORIDE 5 MG/1
5 TABLET, FILM COATED ORAL
COMMUNITY

## 2021-12-02 RX ADMIN — LIDOCAINE HYDROCHLORIDE 5 ML: 20 JELLY TOPICAL at 09:54

## 2021-12-02 NOTE — DISCHARGE INSTRUCTIONS
Harlan [place]  [id number]  1965      MANOMETRY DISCHARGE INSTRUCTION    You may resume your regular diet as tolerated. You may resume your normal daily activities. If you develop a sore throat- throat lozenges or warm salt water gargles will help. Call your Physician if you have any complications or questions. [employer] Activation    Thank you for requesting access to [employer]. Please follow the instructions below to securely access and download your online medical record. [employer] allows you to send messages to your doctor, view your test results, renew your prescriptions, schedule appointments, and more. How Do I Sign Up? 1. In your internet browser, go to [email or web address]  2. Click on the First Time User? Click Here link in the Sign In box. You will be redirect to the New Member Sign Up page. 3. Enter your [employer] Access Code exactly as it appears below. You will not need to use this code after youve completed the sign-up process. If you do not sign up before the expiration date, you must request a new code. [employer] Access Code: Activation code not generated  Current [employer] Status: Active (This is the date your [employer] access code will )    4. Enter the last four digits of your Social Security Number (xxxx) and Date of Birth (mm/dd/yyyy) as indicated and click Submit. You will be taken to the next sign-up page. 5. Create a [employer] ID. This will be your [employer] login ID and cannot be changed, so think of one that is secure and easy to remember. 6. Create a [employer] password. You can change your password at any time. 7. Enter your Password Reset Question and Answer. This can be used at a later time if you forget your password. 8. Enter your e-mail address. You will receive e-mail notification when new information is available in [address] 9. Click Sign Up. You can now view and download portions of your medical record.   10. Click the Download Summary menu link to download a portable copy of your medical information. Additional Information    If you have questions, please visit the Frequently Asked Questions section of the [employer] website at [email or web address] Remember, [employer] is NOT to be used for urgent needs. For medical emergencies, dial 911.

## 2021-12-22 NOTE — PROCEDURES
[address]  PROCEDURE NOTE    Name:  Imelda Mary  MR#:  [id number]  :  1965  ACCOUNT #:  [de-identified]  DATE OF SERVICE:  2021    PREOPERATIVE DIAGNOSIS:  Dysphagia. POSTOPERATIVE DIAGNOSES:  1. Hiatal hernia. 2.  Normal relaxation of esophagogastric junction. 3.  No Rosston diagnoses. 4.  All impedance boluses emptied completely. 5.  Rapid swallows are given at conclusion of the procedure, there is appropriate deglutitive inhibition. PROCEDURE PLANNED:  1. High-resolution esophageal manometry. 2.  Impedance esophageal manometry. PROCEDURE PERFORMED:  Esophageal manometry  Impedance manometry. SURGEON:  Tip Kuo MD    ASSISTANT:  Yandel Borges RN    ANESTHESIA:  Topical.    ESTIMATED BLOOD LOSS:  None. SPECIMENS REMOVED:  None. COMPLICATIONS:  None. IMPLANTS:  None. DESCRIPTION OF PROCEDURE:  High-resolution esophageal manometry was performed by the nursing staff with subsequent interpretation by Dr. Andrés Vidal. The lower esophageal sphincter is at 35 cm and for a distance of 2.1 cm distally. A 2.3 cm hiatal hernia is present. Lower esophageal sphincter pressures are normal:  Respiratory minimum 19.8, respiratory mean 25.3, residual after swallowing 3.2. Wet swallows are administered. Rosston scoring is as follows:  Percent failed zero, percent weak 20, percent ineffective 20, percent panesophageal pressurization zero, percent premature zero, percent rapid zero, percent fragmented zero, percent intact 80. There are no hypercontractile swallows. Additional high-resolution scoring, distal latency 6.7, .7, highest .9, contractile front velocity 2.3. All normal.  When evaluated in the distal esophagus, 90% of swallows are peristaltic, 10% are simultaneous. The mean wave amplitude is 59 mmHg. The mean wave duration is 3.8 seconds, both normal.  There are no double peaked and no triple peaked waves. The velocity is 3.7 cm/sec. Impedance manometry is performed with a flavored electrolyte solution. All impedance boluses emptied completely. SUMMARY:  This is a normal manometry and normal impedance manometry. A hiatal hernia is present. Roberto Madden MD      [id number]  D:  12/22/2021 16:52  T:  12/22/2021 18:49  JOB #:  [id number]  CC:   MD Grace Juan MD Dotti Cornell, MD

## 2022-03-18 PROBLEM — T78.3XXA ANGIOEDEMA: Status: ACTIVE | Noted: 2020-11-14

## 2022-03-19 PROBLEM — L50.9 HIVES: Status: ACTIVE | Noted: 2020-11-14

## 2022-03-19 PROBLEM — L29.9 ITCHING: Status: ACTIVE | Noted: 2020-11-14

## 2022-03-19 PROBLEM — T78.40XA ALLERGIC REACTION: Status: ACTIVE | Noted: 2020-11-14

## 2022-03-19 PROBLEM — T80.52XA: Status: ACTIVE | Noted: 2020-11-14

## 2022-03-20 PROBLEM — T78.3XXA ALLERGIC ANGIOEDEMA: Status: ACTIVE | Noted: 2020-11-15

## 2022-03-20 PROBLEM — Z88.9 DRUG ALLERGY: Status: ACTIVE | Noted: 2020-11-15

## 2022-12-29 NOTE — PROGRESS NOTES
Faxed the result of labs for CMP to Olivier Santacruz ([phone]).   Is a 75-year-old female with a diffuse body rash. I was asked to do a skin biopsy. I chose the upper back. It was cleaned with alcohol. I used 1% local.  2 mm punch biopsy x2 was done of the back. This was placed in formalin. It will be transported directly to the lab.

## 2023-05-25 RX ORDER — OMEPRAZOLE 40 MG/1
40 CAPSULE, DELAYED RELEASE ORAL DAILY
COMMUNITY

## 2023-05-25 RX ORDER — LEVOTHYROXINE SODIUM 0.03 MG/1
25 TABLET ORAL
COMMUNITY
Start: 2011-06-13

## 2023-05-25 RX ORDER — LEVOCETIRIZINE DIHYDROCHLORIDE 5 MG/1
5 TABLET, FILM COATED ORAL
COMMUNITY

## 2023-05-25 RX ORDER — TROSPIUM CHLORIDE 20 MG/1
20 TABLET, FILM COATED ORAL
COMMUNITY

## 2023-05-25 RX ORDER — MELOXICAM 15 MG/1
15 TABLET ORAL DAILY
COMMUNITY

## 2023-05-25 RX ORDER — ERGOCALCIFEROL 1.25 MG/1
50000 CAPSULE ORAL
COMMUNITY

## 2023-05-25 RX ORDER — HYDROCHLOROTHIAZIDE 25 MG/1
12.5 TABLET ORAL DAILY
COMMUNITY

## 2023-05-25 RX ORDER — FAMOTIDINE 20 MG/1
20 TABLET, FILM COATED ORAL 2 TIMES DAILY
COMMUNITY

## 2023-05-25 RX ORDER — PANTOPRAZOLE SODIUM 40 MG/1
40 TABLET, DELAYED RELEASE ORAL DAILY
COMMUNITY

## 2023-05-25 RX ORDER — SIMVASTATIN 20 MG
20 TABLET ORAL NIGHTLY
COMMUNITY
Start: 2011-04-04

## 2023-06-21 ENCOUNTER — HOSPITAL ENCOUNTER (OUTPATIENT)
Facility: HOSPITAL | Age: 58
Setting detail: RECURRING SERIES
Discharge: HOME | End: 2023-06-24
Payer: MEDICAID

## 2023-06-21 PROCEDURE — 97161 PT EVAL LOW COMPLEX 20 MIN: CPT
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
## 2023-06-26 ENCOUNTER — HOSPITAL ENCOUNTER (OUTPATIENT)
Facility: HOSPITAL | Age: 58
Setting detail: RECURRING SERIES
Discharge: HOME | End: 2023-06-29
Payer: MEDICAID

## 2023-06-26 PROCEDURE — 97110 THERAPEUTIC EXERCISES: CPT

## 2023-06-28 ENCOUNTER — HOSPITAL ENCOUNTER (OUTPATIENT)
Facility: HOSPITAL | Age: 58
Setting detail: RECURRING SERIES
Discharge: HOME | End: 2023-07-01
Payer: MEDICAID

## 2023-06-28 PROCEDURE — 97110 THERAPEUTIC EXERCISES: CPT | Performed by: PHYSICAL THERAPIST

## 2023-07-03 ENCOUNTER — HOSPITAL ENCOUNTER (OUTPATIENT)
Facility: HOSPITAL | Age: 58
Setting detail: RECURRING SERIES
Discharge: HOME | End: 2023-07-06
Payer: MEDICAID

## 2023-07-03 PROCEDURE — 97110 THERAPEUTIC EXERCISES: CPT

## 2023-07-03 NOTE — PROGRESS NOTES
PHYSICAL THERAPY - DAILY TREATMENT NOTE (updated 3/23)      Date: 7/3/2023          Patient Name:  Huong Klein :  1965   Medical   Diagnosis:  Other low back pain [M54.59]  Bilateral knee pain [M25.561, M25.562] Treatment Diagnosis:  M54.59, G89.29  CHRONIC LOWER BACK PAIN    Referral Source:  Jayesh Tinsley* Insurance:   Payor: [employer] / Plan: Twice / Product Type: *No Product type* /                     Patient  verified yes     Visit #   Current  / Total 4 Medical necessity   Time   In / Out 945 am 1030 am    Total Treatment Time 45 minutes   Total Timed Codes 38 minutes         SUBJECTIVE    Pain Level (0-10 scale): 4    Any medication changes, allergies to medications, adverse drug reactions, diagnosis change, or new procedure performed?: [x] No    [] Yes (see summary sheet for update)  Medications: Verified on Patient Summary List    Subjective functional status/changes:     Feeling so-so today. OBJECTIVE      Therapeutic Procedures: Tx Min Billable or 1:1 Min (if diff from Tx Min) Procedure, Rationale, Specifics   38  18475 Therapeutic Exercise (timed):  increase ROM, strength, coordination, balance, and proprioception to improve patient's ability to progress to PLOF and address remaining functional goals. (see flow sheet as applicable)     Details if applicable:            Details if applicable:           Details if applicable:           Details if applicable:            Details if applicable:     38     Total Total       [x]  Patient Education billed concurrently with other procedures   [x] Review HEP    [] Progressed/Changed HEP, detail:    [] Other detail:         Other Objective/Functional Measures      Pain Level at end of session (0-10 scale): 5      Assessment   Pt tolerated session well today. Some discomfort noted due to theraband around thighs. Overall no further discomfort completing other TE.     Patient will continue to benefit from skilled PT / OT

## 2023-07-05 ENCOUNTER — HOSPITAL ENCOUNTER (OUTPATIENT)
Facility: HOSPITAL | Age: 58
Setting detail: RECURRING SERIES
Discharge: HOME | End: 2023-07-08
Payer: MEDICAID

## 2023-07-05 PROCEDURE — 97110 THERAPEUTIC EXERCISES: CPT

## 2023-07-05 NOTE — PROGRESS NOTES
PHYSICAL THERAPY - DAILY TREATMENT NOTE (updated 3/23)      Date: 2023          Patient Name:  Neptali Vines :  1965   Medical   Diagnosis:  Other low back pain [M54.59]  Bilateral knee pain [M25.561, M25.562] Treatment Diagnosis:  M54.59, G89.29  CHRONIC LOWER BACK PAIN    Referral Source:  Salasley Kehr* Insurance:   Payor: Yaakov Ardon / Plan: Yaakov Ardon / Product Type: *No Product type* /                     Patient  verified yes     Visit #   Current  / Total 5 Medical necessity   Time   In / Out 800 845   Total Treatment Time 45 minutes   Total Timed Codes 40 minutes         SUBJECTIVE    Pain Level (0-10 scale): 4    Any medication changes, allergies to medications, adverse drug reactions, diagnosis change, or new procedure performed?: [x] No    [] Yes (see summary sheet for update)  Medications: Verified on Patient Summary List    Subjective functional status/changes:     Feeling so-so today. OBJECTIVE      Therapeutic Procedures: Tx Min Billable or 1:1 Min (if diff from Tx Min) Procedure, Rationale, Specifics   40  90143 Therapeutic Exercise (timed):  increase ROM, strength, coordination, balance, and proprioception to improve patient's ability to progress to PLOF and address remaining functional goals. (see flow sheet as applicable)     Details if applicable:            Details if applicable:           Details if applicable:           Details if applicable:            Details if applicable:     40     Total Total       [x]  Patient Education billed concurrently with other procedures   [x] Review HEP    [] Progressed/Changed HEP, detail:    [] Other detail:         Other Objective/Functional Measures      Pain Level at end of session (0-10 scale): 4      Assessment   Pt tolerated session well today. Overall no discomfort completing TE.     Patient will continue to benefit from skilled PT / OT services to modify and progress therapeutic interventions, analyze and address

## 2023-07-10 ENCOUNTER — HOSPITAL ENCOUNTER (OUTPATIENT)
Facility: HOSPITAL | Age: 58
Setting detail: RECURRING SERIES
Discharge: HOME | End: 2023-07-13
Payer: MEDICAID

## 2023-07-10 PROCEDURE — 97110 THERAPEUTIC EXERCISES: CPT

## 2023-07-10 NOTE — PROGRESS NOTES
PHYSICAL THERAPY - DAILY TREATMENT NOTE (updated 3/23)      Date: 7/10/2023          Patient Name:  Melony Nixon :  1965   Medical   Diagnosis:  Other low back pain [M54.59]  Bilateral knee pain [M25.561, M25.562] Treatment Diagnosis:  M54.59, G89.29  CHRONIC LOWER BACK PAIN    Referral Source:  Anatoliy Kathleen* Insurance:   Payor: Sai Abbasi / Plan: Sai Abbasi / Product Type: *No Product type* /                     Patient  verified yes     Visit #   Current  / Total 6 Medical necessity   Time   In / Out 1030 11   Total Treatment Time 30 minutes   Total Timed Codes 26 minutes         SUBJECTIVE    Pain Level (0-10 scale): 2    Any medication changes, allergies to medications, adverse drug reactions, diagnosis change, or new procedure performed?: [x] No    [] Yes (see summary sheet for update)  Medications: Verified on Patient Summary List    Subjective functional status/changes:     Feeling so-so today. Walked for about 15-20mins at the store before pain set in. OBJECTIVE      Therapeutic Procedures: Tx Min Billable or 1:1 Min (if diff from Tx Min) Procedure, Rationale, Specifics   26  59045 Therapeutic Exercise (timed):  increase ROM, strength, coordination, balance, and proprioception to improve patient's ability to progress to PLOF and address remaining functional goals. (see flow sheet as applicable)     Details if applicable:            Details if applicable:           Details if applicable:           Details if applicable:            Details if applicable:     26     Total Total       [x]  Patient Education billed concurrently with other procedures   [x] Review HEP    [] Progressed/Changed HEP, detail:    [] Other detail:         Other Objective/Functional Measures      Pain Level at end of session (0-10 scale): 4      Assessment   Pt tolerated session well today. Overall no discomfort completing TE. Pt able to tolerate about 15 minutes of ambulation before pain (8/10).

## 2023-07-12 ENCOUNTER — APPOINTMENT (OUTPATIENT)
Facility: HOSPITAL | Age: 58
End: 2023-07-12
Payer: MEDICAID

## 2023-07-12 ENCOUNTER — HOSPITAL ENCOUNTER (OUTPATIENT)
Facility: HOSPITAL | Age: 58
Setting detail: RECURRING SERIES
Discharge: HOME | End: 2023-07-15
Payer: MEDICAID

## 2023-07-12 PROCEDURE — 97110 THERAPEUTIC EXERCISES: CPT

## 2023-07-12 NOTE — PROGRESS NOTES
PHYSICAL THERAPY - DAILY TREATMENT NOTE (updated 3/23)      Date: 2023          Patient Name:  Candelaria Baker :  1965   Medical   Diagnosis:  Other low back pain [M54.59]  Bilateral knee pain [M25.561, M25.562] Treatment Diagnosis:  M54.59, G89.29  CHRONIC LOWER BACK PAIN    Referral Source:  [facility]* Insurance:   Payor: Efraín Slaughter / Plan: Efraín Slaughter / Product Type: *No Product type* /                     Patient  verified yes     Visit #   Current  / Total 7 Medical necessity   Time   In / Out 735 820   Total Treatment Time 45   minutes   Total Timed Codes 40 minutes         SUBJECTIVE    Pain Level (0-10 scale): 4           back of both thighs are sore this morning    Any medication changes, allergies to medications, adverse drug reactions, diagnosis change, or new procedure performed?: [x] No    [] Yes (see summary sheet for update)  Medications: Verified on Patient Summary List    Subjective functional status/changes:          OBJECTIVE      Therapeutic Procedures: Tx Min Billable or 1:1 Min (if diff from Tx Min) Procedure, Rationale, Specifics   45  26582 Therapeutic Exercise (timed):  increase ROM, strength, coordination, balance, and proprioception to improve patient's ability to progress to PLOF and address remaining functional goals. (see flow sheet as applicable)     Details if applicable:            Details if applicable:           Details if applicable:           Details if applicable:            Details if applicable:     45     Total Total       [x]  Patient Education billed concurrently with other procedures   [x] Review HEP    [] Progressed/Changed HEP, detail:    [] Other detail:         Other Objective/Functional Measures      Pain Level at end of session (0-10 scale): 4      Assessment   Pt tolerated session well today. Overall no discomfort completing TE.    Patient will continue to benefit from skilled PT / OT services to modify and progress therapeutic

## 2023-07-17 ENCOUNTER — HOSPITAL ENCOUNTER (OUTPATIENT)
Facility: HOSPITAL | Age: 58
Setting detail: RECURRING SERIES
Discharge: HOME | End: 2023-07-20
Payer: MEDICAID

## 2023-07-17 PROCEDURE — 97110 THERAPEUTIC EXERCISES: CPT

## 2023-07-17 NOTE — PROGRESS NOTES
PHYSICAL THERAPY - DAILY TREATMENT NOTE (updated 3/23)      Date: 2023          Patient Name:  Semaj Menchaca :  1965   Medical   Diagnosis:  Other low back pain [M54.59]  Bilateral knee pain [M25.561, M25.562] Treatment Diagnosis:  M54.59, G89.29  CHRONIC LOWER BACK PAIN    Referral Source:  Pablo [facility]ows* Insurance:   Payor: [employer] / Plan: [employer] / Product Type: *No Product type* /                     Patient  verified yes     Visit #   Current  / Total 8 16   Time   In / Out 145 235   Total Treatment Time 50   minutes   Total Timed Codes 45 minutes         SUBJECTIVE    Pain Level (0-10 scale): 3          limps when she first stands up    Any medication changes, allergies to medications, adverse drug reactions, diagnosis change, or new procedure performed?: [x] No    [] Yes (see summary sheet for update)  Medications: Verified on Patient Summary List    Subjective functional status/changes:      Sore posterior thighs. Limps when she first stands up. Swelling in both legs - using a compression pump but does not use stockings as they were too painful and cut in on her legs. OBJECTIVE      Therapeutic Procedures: Tx Min Billable or 1:1 Min (if diff from Tx Min) Procedure, Rationale, Specifics   45  88033 Therapeutic Exercise (timed):  increase ROM, strength, coordination, balance, and proprioception to improve patient's ability to progress to PLOF and address remaining functional goals. (see flow sheet as applicable)     Details if applicable:            Details if applicable:           Details if applicable:           Details if applicable:            Details if applicable:     45     Total Total       [x]  Patient Education billed concurrently with other procedures   [x] Review HEP    [] Progressed/Changed HEP, detail:    [x] Other detail:   discussed alternative to compression stockings to manage the swelling that could assist with decreasing her pain.       Other

## 2023-07-19 ENCOUNTER — HOSPITAL ENCOUNTER (OUTPATIENT)
Facility: HOSPITAL | Age: 58
Setting detail: RECURRING SERIES
Discharge: HOME | End: 2023-07-22
Payer: MEDICAID

## 2023-07-19 PROCEDURE — 97110 THERAPEUTIC EXERCISES: CPT

## 2023-07-19 NOTE — PROGRESS NOTES
PHYSICAL THERAPY - DAILY TREATMENT NOTE (updated 3/23)      Date: 2023          Patient Name:  Orlando Saeed :  1965   Medical   Diagnosis:  Other low back pain [M54.59]  Bilateral knee pain [M25.561, M25.562] Treatment Diagnosis:  M54.59, G89.29  CHRONIC LOWER BACK PAIN    Referral Source:  Abelino Merida* Insurance:   Payor: Eric Botello / Plan: Eric Botello / Product Type: *No Product type* /                     Patient  verified yes     Visit #   Current  / Total 9 16   Time   In / Out 700 745   Total Treatment Time 45 minutes   Total Timed Codes 40minutes         SUBJECTIVE    Pain Level (0-10 scale): 3          limps when she first stands up    Any medication changes, allergies to medications, adverse drug reactions, diagnosis change, or new procedure performed?: [x] No    [] Yes (see summary sheet for update)  Medications: Verified on Patient Summary List    Subjective functional status/changes:      Sore posterior thighs. Limps when she first stands up. Swelling in both legs - using a compression pump but does not use stockings as they were too painful and cut in on her legs. OBJECTIVE      Therapeutic Procedures: Tx Min Billable or 1:1 Min (if diff from Tx Min) Procedure, Rationale, Specifics   40  85744 Therapeutic Exercise (timed):  increase ROM, strength, coordination, balance, and proprioception to improve patient's ability to progress to PLOF and address remaining functional goals. (see flow sheet as applicable)     Details if applicable:            Details if applicable:           Details if applicable:           Details if applicable:            Details if applicable:     40     Total Total       [x]  Patient Education billed concurrently with other procedures   [x] Review HEP    [] Progressed/Changed HEP, detail:    [x] Other detail:   discussed alternative to compression stockings to manage the swelling that could assist with decreasing her pain.       Other

## 2023-07-24 ENCOUNTER — HOSPITAL ENCOUNTER (OUTPATIENT)
Facility: HOSPITAL | Age: 58
Setting detail: RECURRING SERIES
Discharge: HOME | End: 2023-07-27
Payer: MEDICAID

## 2023-07-24 PROCEDURE — 97110 THERAPEUTIC EXERCISES: CPT

## 2023-07-24 NOTE — PROGRESS NOTES
[address]  [address], [address], [address] [address]  Phone: [phone]    Fax: [phone]    PHYSICAL THERAPY PROGRESS NOTE  Patient Name:  Narendra Gerber :  1965   Treatment/Medical Diagnosis: Other low back pain [M54.59]  Bilateral knee pain [M25.561, M25.562]   Referral Source:  Sierra Kohli     Date of Initial Visit:  23 Attended Visits:  10 Missed Visits:  na     SUMMARY OF TREATMENT/ASSESSMENT:  Feeling better. Pain score down to 2/10. Awaiting script from [facility] for lymphedema PT. I need left hip replacement and both knees but I have to loose weight first.    CURRENT STATUS  As above     Short Term Goals: To be accomplished in 8 treatments  SLS TKE LEFT LEG WFL W/O CLICKING OF PATELLA  Long Term Goals: To be accomplished in 16 treatments  COMMUNITY AMBULATOR WITH PAIN <6/10       RECOMMENDATIONS  Continue PT        Liliana Acuna, PT       2023       8:46 AM    If you have any questions/comments please contact us directly at [phone]. Thank you for allowing us to assist in the care of your patient.

## 2023-07-24 NOTE — PROGRESS NOTES
PHYSICAL THERAPY - DAILY TREATMENT NOTE (updated 3/23)      Date: 2023          Patient Name:  Jerome Monday :  1965   Medical   Diagnosis:  Other low back pain [M54.59]  Bilateral knee pain [M25.561, M25.562] Treatment Diagnosis:  M54.59, G89.29  CHRONIC LOWER BACK PAIN    Referral Source:  [place]* Insurance:   Payor: Tita Saini / Plan: Tita Saini / Product Type: *No Product type* /                     Patient  verified yes     Visit #   Current  / Total 10 16   Time   In / Out 730 815   Total Treatment Time 45 minutes   Total Timed Codes 40minutes         SUBJECTIVE    Pain Level (0-10 scale): 2          limps when she first stands up    Any medication changes, allergies to medications, adverse drug reactions, diagnosis change, or new procedure performed?: [x] No    [] Yes (see summary sheet for update)  Medications: Verified on Patient Summary List    Subjective functional status/changes:      Sore posterior thighs. Limps when she first stands up. Swelling in both legs - using a compression pump but does not use stockings as they were too painful and cut in on her legs. OBJECTIVE      Therapeutic Procedures: Tx Min Billable or 1:1 Min (if diff from Tx Min) Procedure, Rationale, Specifics   40  77489 Therapeutic Exercise (timed):  increase ROM, strength, coordination, balance, and proprioception to improve patient's ability to progress to PLOF and address remaining functional goals. (see flow sheet as applicable)     Details if applicable:            Details if applicable:           Details if applicable:           Details if applicable:            Details if applicable:     40     Total Total       [x]  Patient Education billed concurrently with other procedures   [x] Review HEP    [] Progressed/Changed HEP, detail:    [x] Other detail:   discussed alternative to compression stockings to manage the swelling that could assist with decreasing her pain.       Other

## 2023-07-26 ENCOUNTER — HOSPITAL ENCOUNTER (OUTPATIENT)
Facility: HOSPITAL | Age: 58
Setting detail: RECURRING SERIES
Discharge: HOME | End: 2023-07-29
Payer: MEDICAID

## 2023-07-26 PROCEDURE — 97110 THERAPEUTIC EXERCISES: CPT

## 2023-07-26 NOTE — PROGRESS NOTES
PHYSICAL THERAPY - DAILY TREATMENT NOTE (updated 3/23)      Date: 2023          Patient Name:  Candelaria Baker :  1965   Medical   Diagnosis:  Other low back pain [M54.59]  Bilateral knee pain [M25.561, M25.562] Treatment Diagnosis:  M54.59, G89.29  CHRONIC LOWER BACK PAIN    Referral Source:  [facility]* Insurance:   Payor: Efraín Slaughter / Plan: Efraín Slaughter / Product Type: *No Product type* /                     Patient  verified yes     Visit #   Current  / Total 11 16   Time   In / Out 655 740   Total Treatment Time 45 minutes   Total Timed Codes 40minutes         SUBJECTIVE    Pain Level (0-10 scale): 4          TRYING TO GET [facility] TO GIVE ME LYMPHEDEMA RX    Any medication changes, allergies to medications, adverse drug reactions, diagnosis change, or new procedure performed?: [x] No    [] Yes (see summary sheet for update)  Medications: Verified on Patient Summary List    Subjective functional status/changes:      Sore posterior thighs. Limps when she first stands up. Swelling in both legs - using a compression pump but does not use stockings as they were too painful and cut in on her legs. OBJECTIVE      Therapeutic Procedures: Tx Min Billable or 1:1 Min (if diff from Tx Min) Procedure, Rationale, Specifics   40  91657 Therapeutic Exercise (timed):  increase ROM, strength, coordination, balance, and proprioception to improve patient's ability to progress to PLOF and address remaining functional goals.  (see flow sheet as applicable)     Details if applicable:            Details if applicable:           Details if applicable:           Details if applicable:            Details if applicable:     40     Total Total       [x]  Patient Education billed concurrently with other procedures   [x] Review HEP    [] Progressed/Changed HEP, detail:    [x] Other detail:   discussed alternative to compression stockings to manage the swelling that could assist with decreasing her

## 2023-07-31 ENCOUNTER — HOSPITAL ENCOUNTER (OUTPATIENT)
Facility: HOSPITAL | Age: 58
Setting detail: RECURRING SERIES
Discharge: HOME | End: 2023-08-03
Payer: MEDICAID

## 2023-07-31 PROCEDURE — 97110 THERAPEUTIC EXERCISES: CPT

## 2023-07-31 NOTE — PROGRESS NOTES
PHYSICAL THERAPY - DAILY TREATMENT NOTE (updated 3/23)      Date: 2023          Patient Name:  Katt Mcbride :  1965   Medical   Diagnosis:  Other low back pain [M54.59]  Bilateral knee pain [M25.561, M25.562] Treatment Diagnosis:  M54.59, G89.29  CHRONIC LOWER BACK PAIN    Referral Source:  [facility] Lavender* Insurance:   Payor: [employer] / Plan: [employer] / Product Type: *No Product type* /                     Patient  verified yes     Visit #   Current  / Total 12 16   Time   In / Out 725 815         Total Treatment Time 45 minutes   Total Timed Codes 40minutes         SUBJECTIVE    Pain Level (0-10 scale): 2         TRYING TO GET [facility] OR PCP TO GIVE ME LYMPHEDEMA RX    Any medication changes, allergies to medications, adverse drug reactions, diagnosis change, or new procedure performed?: [x] No    [] Yes (see summary sheet for update)  Medications: Verified on Patient Summary List    Subjective functional status/changes:     NOT BAD SO FAR TODAY  OBJECTIVE      Therapeutic Procedures: Tx Min Billable or 1:1 Min (if diff from Tx Min) Procedure, Rationale, Specifics   40  99804 Therapeutic Exercise (timed):  increase ROM, strength, coordination, balance, and proprioception to improve patient's ability to progress to PLOF and address remaining functional goals. (see flow sheet as applicable)     Details if applicable:            Details if applicable:           Details if applicable:           Details if applicable:            Details if applicable:     40     Total Total       [x]  Patient Education billed concurrently with other procedures   [x] Review HEP    [] Progressed/Changed HEP, detail:    [] Other detail:   discussed alternative to compression stockings to manage the swelling that could assist with decreasing her pain.       Other Objective/Functional Measures   Limps during walking       Pain Level at end of session (0-10 scale): 2      Assessment   Pt tolerated session

## 2023-08-02 ENCOUNTER — HOSPITAL ENCOUNTER (OUTPATIENT)
Facility: HOSPITAL | Age: 58
Setting detail: RECURRING SERIES
Discharge: HOME | End: 2023-08-05
Payer: MEDICAID

## 2023-08-02 PROCEDURE — 97110 THERAPEUTIC EXERCISES: CPT

## 2023-08-02 NOTE — PROGRESS NOTES
PHYSICAL THERAPY - DAILY TREATMENT NOTE (updated 3/23)      Date: 2023          Patient Name:  Adrianna Aragon :  1965   Medical   Diagnosis:  Other low back pain [M54.59]  Bilateral knee pain [M25.561, M25.562] Treatment Diagnosis:  M54.59, G89.29  CHRONIC LOWER BACK PAIN    Referral Source:  Helayne Feeling* Insurance:   Payor: [employer] / Plan: [employer] / Product Type: *No Product type* /                     Patient  verified yes     Visit #   Current  / Total 13 16   Time   In / Out 745 830         Total Treatment Time 45 minutes   Total Timed Codes 40minutes         SUBJECTIVE    Pain Level (0-10 scale): 2         TRYING TO GET [facility] OR PCP TO GIVE ME LYMPHEDEMA RX; I see them 23; guess I will have to get it then. Any medication changes, allergies to medications, adverse drug reactions, diagnosis change, or new procedure performed?: [x] No    [] Yes (see summary sheet for update)  Medications: Verified on Patient Summary List    Subjective functional status/changes:     NOT BAD SO FAR TODAY  OBJECTIVE      Therapeutic Procedures: Tx Min Billable or 1:1 Min (if diff from Tx Min) Procedure, Rationale, Specifics   40  36760 Therapeutic Exercise (timed):  increase ROM, strength, coordination, balance, and proprioception to improve patient's ability to progress to PLOF and address remaining functional goals. (see flow sheet as applicable)     Details if applicable:            Details if applicable:           Details if applicable:           Details if applicable:            Details if applicable:     40     Total Total       [x]  Patient Education billed concurrently with other procedures   [x] Review HEP    [] Progressed/Changed HEP, detail:    [] Other detail:   discussed alternative to compression stockings to manage the swelling that could assist with decreasing her pain.       Other Objective/Functional Measures   Limps during walking       Pain Level at end of session

## 2023-08-07 ENCOUNTER — HOSPITAL ENCOUNTER (OUTPATIENT)
Facility: HOSPITAL | Age: 58
Setting detail: RECURRING SERIES
Discharge: HOME | End: 2023-08-10
Payer: MEDICAID

## 2023-08-07 PROCEDURE — 97110 THERAPEUTIC EXERCISES: CPT

## 2023-08-07 NOTE — PROGRESS NOTES
PHYSICAL THERAPY - DAILY TREATMENT NOTE (updated 3/23)      Date: 2023          Patient Name:  Jared Aparicio :  1965   Medical   Diagnosis:  Other low back pain [M54.59]  Bilateral knee pain [M25.561, M25.562] Treatment Diagnosis:  M54.59, G89.29  CHRONIC LOWER BACK PAIN    Referral Source:  Mariya Farmer* Insurance:   [facility] / Plan: [employer]  / Product Type: *No Product type* /                     Patient  verified yes     Visit #   Current  / Total 14 16   Time   In / Out 915 1000         Total Treatment Time 45 minutes   Total Timed Codes 40minutes         SUBJECTIVE    Pain Level (0-10 scale):   4-5/10 knees and shins    Any medication changes, allergies to medications, adverse drug reactions, diagnosis change, or new procedure performed?: [x] No    [] Yes (see summary sheet for update)  Medications: Verified on Patient Summary List    Subjective functional status/changes:     OBJECTIVE      Therapeutic Procedures: Tx Min Billable or 1:1 Min (if diff from Tx Min) Procedure, Rationale, Specifics   40  90473 Therapeutic Exercise (timed):  increase ROM, strength, coordination, balance, and proprioception to improve patient's ability to progress to PLOF and address remaining functional goals. (see flow sheet as applicable)     Details if applicable:            Details if applicable:           Details if applicable:           Details if applicable:            Details if applicable:     40     Total Total       [x]  Patient Education billed concurrently with other procedures   [x] Review HEP    [] Progressed/Changed HEP, detail:    [] Other detail:   discussed alternative to compression stockings to manage the swelling that could assist with decreasing her pain. Other Objective/Functional Measures   Limps during walking       Pain Level at end of session (0-10 scale): 4      Assessment   Pt tolerated session well today.  Overall no discomfort completing TE.

## 2023-08-09 ENCOUNTER — HOSPITAL ENCOUNTER (OUTPATIENT)
Facility: HOSPITAL | Age: 58
Setting detail: RECURRING SERIES
Discharge: HOME | End: 2023-08-12
Payer: MEDICAID

## 2023-08-09 PROCEDURE — 97110 THERAPEUTIC EXERCISES: CPT

## 2023-08-09 NOTE — PROGRESS NOTES
PHYSICAL THERAPY - DAILY TREATMENT NOTE (updated 3/23)      Date: 2023          Patient Name:  Kirk Sheth :  1965   Medical   Diagnosis:  Other low back pain [M54.59]  Bilateral knee pain [M25.561, M25.562] Treatment Diagnosis:  M54.59, G89.29  CHRONIC LOWER BACK PAIN    Referral Source:  Nancy Lambert* Insurance:   Payor: Raya Montero / Plan: Raya Montero / Product Type: *No Product type* /                     Patient  verified yes     Visit #   Current  / Total 15 16   Time   In / Out 945 1030         Total Treatment Time 45 minutes   Total Timed Codes 40minutes         SUBJECTIVE    Pain Level (0-10 scale):   4/10     Any medication changes, allergies to medications, adverse drug reactions, diagnosis change, or new procedure performed?: [x] No    [] Yes (see summary sheet for update)  Medications: Verified on Patient Summary List    Subjective functional status/changes:     OBJECTIVE      Therapeutic Procedures: Tx Min Billable or 1:1 Min (if diff from Tx Min) Procedure, Rationale, Specifics   40  53918 Therapeutic Exercise (timed):  increase ROM, strength, coordination, balance, and proprioception to improve patient's ability to progress to PLOF and address remaining functional goals. (see flow sheet as applicable)     Details if applicable:            Details if applicable:           Details if applicable:           Details if applicable:            Details if applicable:     40     Total Total       [x]  Patient Education billed concurrently with other procedures   [x] Review HEP    [] Progressed/Changed HEP, detail:    [] Other detail:   discussed alternative to compression stockings to manage the swelling that could assist with decreasing her pain. Other Objective/Functional Measures   Limps during walking       Pain Level at end of session (0-10 scale): 4      Assessment   Pt tolerated session well today.  Overall no discomfort completing TE.   WORKING ON GETTING

## 2023-08-14 ENCOUNTER — HOSPITAL ENCOUNTER (OUTPATIENT)
Facility: HOSPITAL | Age: 58
Setting detail: RECURRING SERIES
Discharge: HOME | End: 2023-08-17
Payer: MEDICAID

## 2023-08-14 PROCEDURE — 97110 THERAPEUTIC EXERCISES: CPT

## 2023-08-14 NOTE — PROGRESS NOTES
PHYSICAL THERAPY - DAILY TREATMENT NOTE (updated 3/23)      Date: 2023          Patient Name:  Narendra Gerber :  1965   Medical   Diagnosis:  Other low back pain [M54.59]  Bilateral knee pain [M25.561, M25.562] Treatment Diagnosis:  M54.59, G89.29  CHRONIC LOWER BACK PAIN    Referral Source:  Sierra Kohli Insurance:   Payor: [employer] / Plan: Nella Wilfredo / Product Type: *No Product type* /                     Patient  verified yes     Visit #   Current  / Total 16 16   Time   In / Out 825 910         Total Treatment Time 45 minutes   Total Timed Codes 40minutes         SUBJECTIV  Pain Level (0-10 scale):   3 /10     Any medication changes, allergies to medications, adverse drug reactions, diagnosis change, or new procedure performed?: [x] No    [] Yes (see summary sheet for update)  Medications: Verified on Patient Summary List    Subjective functional status/changes:     OBJECTIVE      Therapeutic Procedures: Tx Min Billable or 1:1 Min (if diff from Tx Min) Procedure, Rationale, Specifics   40  96035 Therapeutic Exercise (timed):  increase ROM, strength, coordination, balance, and proprioception to improve patient's ability to progress to PLOF and address remaining functional goals. (see flow sheet as applicable)     Details if applicable:            Details if applicable:           Details if applicable:           Details if applicable:            Details if applicable:     40     Total Total       [x]  Patient Education billed concurrently with other procedures   [x] Review HEP    [] Progressed/Changed HEP, detail:    [] Other detail:   discussed alternative to compression stockings to manage the swelling that could assist with decreasing her pain. Other Objective/Functional Measures   Limps during walking especially when she first gets up. Pain Level at end of session (0-10 scale): 3      Assessment   Pt tolerated session well today.  Overall no discomfort

## 2023-08-14 NOTE — THERAPY DISCHARGE
[address]  [address], [address], [address]  Phone: [phone]    Fax: [phone]    DISCHARGE SUMMARY  Patient Name: Melony Nixon : 1965   Treatment/Medical Diagnosis: Other low back pain [M54.59]  Bilateral knee pain [M25.561, M25.562]   Referral Source: Anatoliy Kathleen*     Date of Initial Visit: 23 Attended Visits: 16 Missed Visits: NA     SUMMARY OF TREATMENT  GOALS ACHIEVED; DC PT    CURRENT STATUS  DOING WELL. LOOKING FORWARD TO GETTING LYMPHEDEMA VELCRO GARMENTS. Short Term Goals: To be accomplished in 8 treatments  SLS TKE LEFT LEG WFL W/O CLICKING OF PATELLA;  MET  Long Term Goals: To be accomplished in 16 treatments  COMMUNITY AMBULATOR WITH PAIN <6/10; MET 23    RECOMMENDATIONS  Discontinue therapy. Progressing towards or have reached established goals. Vinayak Lerma, PT       2023       9:44 AM    ________________________________________________________________________________________________________________________________________________________________________    NOTE TO PHYSICIAN:  Your patient's insurance requires this discharge note be signed and returned. ___ I have read the above report and request that my patient be discharged from therapy. Physician's Signature:_________________________   DATE:_________   TIME:________                           Nehemiah Moreira, APR*    ** Signature, Date and Time must be completed for valid certification **  Please sign and fax to [phone].    Thank you

## 2023-08-16 ENCOUNTER — APPOINTMENT (OUTPATIENT)
Facility: HOSPITAL | Age: 58
End: 2023-08-16
Payer: MEDICAID

## 2024-06-19 ENCOUNTER — ANESTHESIA EVENT (OUTPATIENT)
Facility: HOSPITAL | Age: 59
End: 2024-06-19
Payer: MEDICAID

## 2024-06-19 RX ORDER — ALLOPURINOL 100 MG/1
100 TABLET ORAL DAILY
COMMUNITY

## 2024-06-19 RX ORDER — COLCHICINE 0.6 MG/1
0.6 TABLET ORAL DAILY
COMMUNITY

## 2024-06-20 ENCOUNTER — ANESTHESIA (OUTPATIENT)
Facility: HOSPITAL | Age: 59
End: 2024-06-20
Payer: MEDICAID

## 2024-06-20 ENCOUNTER — HOSPITAL ENCOUNTER (OUTPATIENT)
Facility: HOSPITAL | Age: 59
Setting detail: OUTPATIENT SURGERY
Discharge: HOME | End: 2024-06-20
Attending: INTERNAL MEDICINE | Admitting: INTERNAL MEDICINE
Payer: MEDICAID

## 2024-06-20 VITALS
DIASTOLIC BLOOD PRESSURE: 76 MMHG | TEMPERATURE: 97.8 F | HEART RATE: 71 BPM | HEIGHT: 60 IN | WEIGHT: 211.1 LBS | BODY MASS INDEX: 41.44 KG/M2 | OXYGEN SATURATION: 99 % | RESPIRATION RATE: 15 BRPM | SYSTOLIC BLOOD PRESSURE: 102 MMHG

## 2024-06-20 PROCEDURE — 2720000010 HC SURG SUPPLY STERILE: Performed by: INTERNAL MEDICINE

## 2024-06-20 PROCEDURE — 3600007502: Performed by: INTERNAL MEDICINE

## 2024-06-20 PROCEDURE — 3600007512: Performed by: INTERNAL MEDICINE

## 2024-06-20 PROCEDURE — 2709999900 HC NON-CHARGEABLE SUPPLY: Performed by: INTERNAL MEDICINE

## 2024-06-20 PROCEDURE — 3700000000 HC ANESTHESIA ATTENDED CARE: Performed by: INTERNAL MEDICINE

## 2024-06-20 PROCEDURE — 7100000011 HC PHASE II RECOVERY - ADDTL 15 MIN: Performed by: INTERNAL MEDICINE

## 2024-06-20 PROCEDURE — 2580000003 HC RX 258: Performed by: NURSE ANESTHETIST, CERTIFIED REGISTERED

## 2024-06-20 PROCEDURE — 2580000003 HC RX 258: Performed by: INTERNAL MEDICINE

## 2024-06-20 PROCEDURE — 2500000003 HC RX 250 WO HCPCS: Performed by: NURSE ANESTHETIST, CERTIFIED REGISTERED

## 2024-06-20 PROCEDURE — 3700000001 HC ADD 15 MINUTES (ANESTHESIA): Performed by: INTERNAL MEDICINE

## 2024-06-20 PROCEDURE — 6360000002 HC RX W HCPCS: Performed by: NURSE ANESTHETIST, CERTIFIED REGISTERED

## 2024-06-20 PROCEDURE — 88305 TISSUE EXAM BY PATHOLOGIST: CPT

## 2024-06-20 PROCEDURE — 7100000010 HC PHASE II RECOVERY - FIRST 15 MIN: Performed by: INTERNAL MEDICINE

## 2024-06-20 RX ORDER — SODIUM CHLORIDE 0.9 % (FLUSH) 0.9 %
5-40 SYRINGE (ML) INJECTION PRN
Status: DISCONTINUED | OUTPATIENT
Start: 2024-06-20 | End: 2024-06-20 | Stop reason: HOSPADM

## 2024-06-20 RX ORDER — SIMETHICONE 40MG/0.6ML
40 SUSPENSION, DROPS(FINAL DOSAGE FORM)(ML) ORAL AS NEEDED
Status: DISCONTINUED | OUTPATIENT
Start: 2024-06-20 | End: 2024-06-20 | Stop reason: HOSPADM

## 2024-06-20 RX ORDER — PHENYLEPHRINE HCL IN 0.9% NACL 0.4MG/10ML
SYRINGE (ML) INTRAVENOUS PRN
Status: DISCONTINUED | OUTPATIENT
Start: 2024-06-20 | End: 2024-06-20 | Stop reason: SDUPTHER

## 2024-06-20 RX ORDER — SODIUM CHLORIDE 9 MG/ML
INJECTION, SOLUTION INTRAVENOUS CONTINUOUS
Status: DISCONTINUED | OUTPATIENT
Start: 2024-06-20 | End: 2024-06-20 | Stop reason: HOSPADM

## 2024-06-20 RX ORDER — 0.9 % SODIUM CHLORIDE 0.9 %
INTRAVENOUS SOLUTION INTRAVENOUS PRN
Status: DISCONTINUED | OUTPATIENT
Start: 2024-06-20 | End: 2024-06-20 | Stop reason: SDUPTHER

## 2024-06-20 RX ORDER — SODIUM CHLORIDE 9 MG/ML
INJECTION, SOLUTION INTRAVENOUS CONTINUOUS PRN
Status: COMPLETED | OUTPATIENT
Start: 2024-06-20 | End: 2024-06-20

## 2024-06-20 RX ADMIN — PROPOFOL 90 MG: 10 INJECTION, EMULSION INTRAVENOUS at 14:27

## 2024-06-20 RX ADMIN — PROPOFOL 100 MG: 10 INJECTION, EMULSION INTRAVENOUS at 14:36

## 2024-06-20 RX ADMIN — PROPOFOL 100 MG: 10 INJECTION, EMULSION INTRAVENOUS at 14:41

## 2024-06-20 RX ADMIN — PROPOFOL 70 MG: 10 INJECTION, EMULSION INTRAVENOUS at 14:33

## 2024-06-20 RX ADMIN — PROPOFOL 110 MG: 10 INJECTION, EMULSION INTRAVENOUS at 14:24

## 2024-06-20 RX ADMIN — PROPOFOL 30 MG: 10 INJECTION, EMULSION INTRAVENOUS at 14:29

## 2024-06-20 RX ADMIN — PROPOFOL 80 MG: 10 INJECTION, EMULSION INTRAVENOUS at 14:51

## 2024-06-20 RX ADMIN — LIDOCAINE HYDROCHLORIDE 100 MG: 20 INJECTION, SOLUTION EPIDURAL; INFILTRATION; INTRACAUDAL; PERINEURAL at 14:24

## 2024-06-20 RX ADMIN — Medication 100 MCG: at 14:41

## 2024-06-20 RX ADMIN — PROPOFOL 20 MG: 10 INJECTION, EMULSION INTRAVENOUS at 15:01

## 2024-06-20 RX ADMIN — SODIUM CHLORIDE 1000 ML: 9 INJECTION, SOLUTION INTRAVENOUS at 15:00

## 2024-06-20 ASSESSMENT — PAIN - FUNCTIONAL ASSESSMENT: PAIN_FUNCTIONAL_ASSESSMENT: 0-10

## 2024-06-20 NOTE — ANESTHESIA PRE PROCEDURE
Department of Anesthesiology  Preprocedure Note       Name:  Mercedez Aburto   Age:  58 y.o.  :  1965                                          MRN:  [id number]         Date:  2024      Surgeon: Surgeon(s):  Nika Ag MD    Procedure: Procedure(s):  COLONOSCOPY  ESOPHAGOGASTRODUODENOSCOPY    Medications prior to admission:   Prior to Admission medications    Medication Sig Start Date End Date Taking? Authorizing Provider   allopurinol (ZYLOPRIM) 100 MG tablet Take 1 tablet by mouth daily   Yes Provider, Nat, MD   colchicine (COLCRYS) 0.6 MG tablet Take 1 tablet by mouth daily   Yes Provider, MD Nat   metFORMIN (GLUCOPHAGE) 500 MG tablet Take 1 tablet by mouth 2 times daily (with meals)   Yes Provider, MD Nat   famotidine (PEPCID) 20 MG tablet Take 1 tablet by mouth 2 times daily    Automatic Reconciliation, Ar   hydroCHLOROthiazide (HYDRODIURIL) 25 MG tablet Take 0.5 tablets by mouth daily    Automatic Reconciliation, Ar   levocetirizine (XYZAL) 5 MG tablet Take 1 tablet by mouth    Automatic Reconciliation, Ar   levothyroxine (SYNTHROID) 25 MCG tablet Take 1 tablet by mouth every morning (before breakfast) 11   Automatic Reconciliation, Ar   omeprazole (PRILOSEC) 40 MG delayed release capsule Take 1 capsule by mouth daily    Automatic Reconciliation, Ar   pantoprazole (PROTONIX) 40 MG tablet Take 1 tablet by mouth daily    Automatic Reconciliation, Ar   simvastatin (ZOCOR) 20 MG tablet Take 1 tablet by mouth nightly 11   Automatic Reconciliation, Ar       Current medications:    No current facility-administered medications for this encounter.     Current Outpatient Medications   Medication Sig Dispense Refill    allopurinol (ZYLOPRIM) 100 MG tablet Take 1 tablet by mouth daily      colchicine (COLCRYS) 0.6 MG tablet Take 1 tablet by mouth daily      metFORMIN (GLUCOPHAGE) 500 MG tablet Take 1 tablet by mouth 2 times daily (with meals)      famotidine (PEPCID) 20

## 2024-06-20 NOTE — DISCHARGE INSTRUCTIONS
Mercedez Aburto  [id number]  1965    COLON/EGD DISCHARGE INSTRUCTIONS  Discomfort:  Redness at IV site- apply warm compress to area; if redness or soreness persist- contact your physician  There may be a slight amount of blood passed from the rectum  Gaseous discomfort- walking, belching will help relieve any discomfort  Sore throat- throat lozenges or warm salt water gargle  You may not operate a vehicle for 12 hours  You may not engage in an occupation involving machinery or appliances for rest of today  You may not drink alcoholic beverages for at least 12 hours  Avoid making any critical decisions for at least 24 hour  DIET:   High fiber diet.   - however -  remember your colon is empty and a heavy meal will produce gas.   Avoid these foods:  vegetables, fried / greasy foods, carbonated drinks for today.    MEDICATIONS:      Regarding Aspirin or Nonsteroidal medications, please see below.    ACTIVITY:  You may resume your normal daily activities it is recommended that you spend the remainder of the day resting -  avoid any strenuous activity.    CALL M.D.  ANY SIGN OF:  Increasing pain, nausea, vomiting  Abdominal distension (swelling)  New increased bleeding (oral or rectal)  Fever (chills)  Pain in chest area  Bloody discharge from nose or mouth  Shortness of breath    The use of some over-the-counter pain medication may lead to bleeding after biopsies or other procedures you may have had done.  Tylenol (also called acetaminophen) is safe to take and will not lead to bleeding.      Impressions:  EGD:  2-3 cm hiatal hernia, subtle GEJ stricture w/ minimal reflux esophagitis, dilated to 20 mm CRE dilator but no change post dilation, ? Motility d/o, biopsies taken to r/o EOE and h pylori  Colonoscopy: 3-4 mm hepatic flexure polyp s/p cold snare polypectomy, diverticulosis sigmoid colon, hemorrhoids    Recommendations:   -Await pathology results  -Resume high-fiber diet  -Recommend omeprazole 20 mg twice a

## 2024-06-20 NOTE — PERIOP NOTE
CRE balloon dilatation of the esophagus   18 mm Balloon inflated to 3 ATMs and held for 30 seconds.  19 mm Balloon inflated to 4.5 ATMs and held for 60 seconds.  20 mm Balloon inflated to 6 ATMs and held for 60 seconds.    No subcutaneous crepitus of the chest or cervical region was noted post dilatation.        Scope pre cleaned by Jesús returned to patient

## 2024-06-20 NOTE — ANESTHESIA POSTPROCEDURE EVALUATION
Department of Anesthesiology  Postprocedure Note    Patient: Mercedez Aburto  MRN: [id number]  YOB: 1965  Date of evaluation: 6/20/2024    Procedure Summary       Date: 06/20/24 Room / Location: [facility] ENDO 04 / [facility] ENDOSCOPY    Anesthesia Start: 1421 Anesthesia Stop: 1504    Procedures:       COLONOSCOPY (Lower GI Region)      ESOPHAGOGASTRODUODENOSCOPY (Upper GI Region) Diagnosis:       Chronic idiopathic constipation      Diaphragmatic hernia without obstruction or gangrene      Family history of malignant neoplasm of gastrointestinal tract      Gastrointestinal hemorrhage, unspecified gastrointestinal hemorrhage type      Occult blood in stools      Dysphagia, pharyngoesophageal phase      (Chronic idiopathic constipation [K59.04])      (Diaphragmatic hernia without obstruction or gangrene [K44.9])      (Family history of malignant neoplasm of gastrointestinal tract [Z80.0])      (Gastrointestinal hemorrhage, unspecified gastrointestinal hemorrhage type [K92.2])      (Occult blood in stools [R19.5])      (Dysphagia, pharyngoesophageal phase [R13.14])    Surgeons: Nika gA MD Responsible Provider: Nabeel Vazquez MD    Anesthesia Type: MAC ASA Status: 2            Anesthesia Type: MAC    Narayan Phase I: Narayan Score: 10    Narayan Phase II:      Anesthesia Post Evaluation    Patient location during evaluation: PACU  Patient participation: complete - patient participated  Level of consciousness: awake and alert  Airway patency: patent  Nausea & Vomiting: no nausea  Cardiovascular status: hemodynamically stable  Respiratory status: acceptable  Hydration status: euvolemic    No notable events documented.

## 2024-06-20 NOTE — OP NOTE
[facility]  [facility]  Kalyan Ag MD, [facility]  [phone]         2024    Esophagogastroduodenoscopy & Colonoscopy Procedure Note  Mercedez Aburto  : 1965  [facility] Medical Record Number: [id number]      Indications:  Dysphagia, FH CRC  Referring Physician:  Jazzy Robbins DO  Anesthesia/Sedation: Conscious Sedation/Moderate Sedation/MAC  Endoscopist:  Dr. Kalyan Ag  Complications:  None  Estimated Blood Loss:  None    Permit:  The indications, risks, benefits and alternatives were reviewed with the patient or their decision maker who was provided an opportunity to ask questions and all questions were answered.  The specific risks of esophagogastroduodenoscopy with conscious sedation were reviewed, including but not limited to anesthetic complication, bleeding, adverse drug reaction, missed lesion, infection, IV site reactions, and intestinal perforation which would lead to the need for surgical repair.  Alternatives to EGD and colonoscopy including radiographic imaging, observation without testing, or laboratory testing were reviewed as well as the limitations of those alternatives discussed.  After considering the options and having all their questions answered, the patient or their decision maker provided both verbal and written consent to proceed.      -----------EGD------------   Procedure in Detail:  After obtaining informed consent, positioning of the patient in the left lateral decubitus position, and conduction of a pre-procedure pause or \"time out\" the endoscope was introduced into the mouth and advanced to the duodenum.  A careful inspection was made, and findings or interventions are described below.    Findings:   Esophagus: Diaphragmatic impression at 32- 33 cm from the incisors.  Z-line/EGJ at 30 cm.  There is 2 to 3 cm hiatal hernia.  Hill valve grade 3-4.  There is LA grade A/B esophagitis with

## 2024-06-20 NOTE — H&P
[facility]  Outpatient History and Physical    Patient: Mercedez Aburto    Physician: Nika Ag MD    Vital Signs: Blood pressure 111/70, pulse 84, temperature 97.8 °F (36.6 °C), temperature source Temporal, resp. rate 18, height 1.524 m (5'), weight 95.8 kg (211 lb 1.6 oz), SpO2 98 %.    Allergies:   Allergies   Allergen Reactions    Sulfa Antibiotics Hives       Chief Complaint: Dysphagia, FH CRC    History of Present Illness: Dysphagia, FH CRC    Indication for Procedure: Dysphagia, FH CRC    History:  Past Medical History:   Diagnosis Date    Chronic migraine without aura     GERD (gastroesophageal reflux disease)     High cholesterol     Thyroid disease       Past Surgical History:   Procedure Laterality Date     SECTION      twice    CHOLECYSTECTOMY      ESOPHAGEAL MOTILITY STUDY W/INTERP&RPT  2021    GERD TST W/ NASAL IMPEDENCE ELECTROD  2021    HERNIA REPAIR      ORTHOPEDIC SURGERY        Social History     Socioeconomic History    Marital status:      Spouse name: None    Number of children: None    Years of education: None    Highest education level: None   Tobacco Use    Smoking status: Never    Smokeless tobacco: Never   Vaping Use    Vaping Use: Never used   Substance and Sexual Activity    Alcohol use: No    Drug use: No      Family History   Problem Relation Age of Onset    Hypertension Father        Medications:   Prior to Admission medications    Medication Sig Start Date End Date Taking? Authorizing Provider   allopurinol (ZYLOPRIM) 100 MG tablet Take 1 tablet by mouth daily   Yes ProviderNat MD   colchicine (COLCRYS) 0.6 MG tablet Take 1 tablet by mouth daily   Yes ProviderNat MD   metFORMIN (GLUCOPHAGE) 500 MG tablet Take 1 tablet by mouth 2 times daily (with meals)   Yes Nat Cutler MD   famotidine (PEPCID) 20 MG tablet Take 1 tablet by mouth 2 times daily    Automatic Reconciliation, Ar

## 2024-07-25 ENCOUNTER — HOSPITAL ENCOUNTER (OUTPATIENT)
Facility: HOSPITAL | Age: 59
Setting detail: OUTPATIENT SURGERY
Discharge: HOME | End: 2024-07-25
Attending: INTERNAL MEDICINE | Admitting: INTERNAL MEDICINE
Payer: MEDICARE

## 2024-07-25 VITALS
HEART RATE: 76 BPM | RESPIRATION RATE: 17 BRPM | DIASTOLIC BLOOD PRESSURE: 65 MMHG | SYSTOLIC BLOOD PRESSURE: 130 MMHG | OXYGEN SATURATION: 100 %

## 2024-07-25 PROCEDURE — 6370000000 HC RX 637 (ALT 250 FOR IP): Performed by: INTERNAL MEDICINE

## 2024-07-25 PROCEDURE — 3600007502: Performed by: INTERNAL MEDICINE

## 2024-07-25 PROCEDURE — 7100000010 HC PHASE II RECOVERY - FIRST 15 MIN: Performed by: INTERNAL MEDICINE

## 2024-07-25 PROCEDURE — 3600007512: Performed by: INTERNAL MEDICINE

## 2024-07-25 RX ORDER — CYCLOBENZAPRINE HCL 10 MG
10 TABLET ORAL 2 TIMES DAILY PRN
COMMUNITY

## 2024-07-25 RX ORDER — CETIRIZINE HYDROCHLORIDE 10 MG/1
TABLET ORAL
COMMUNITY

## 2024-07-25 RX ORDER — SEMAGLUTIDE 0.68 MG/ML
INJECTION, SOLUTION SUBCUTANEOUS
COMMUNITY
Start: 2024-07-21

## 2024-07-25 RX ORDER — AMITRIPTYLINE HYDROCHLORIDE 25 MG/1
90 TABLET, FILM COATED ORAL
COMMUNITY

## 2024-07-25 RX ORDER — LIDOCAINE HYDROCHLORIDE 20 MG/ML
JELLY TOPICAL ONCE
Status: COMPLETED | OUTPATIENT
Start: 2024-07-25 | End: 2024-07-25

## 2024-07-25 RX ADMIN — LIDOCAINE HYDROCHLORIDE 6 ML: 20 JELLY TOPICAL at 07:49

## 2024-07-25 ASSESSMENT — PAIN - FUNCTIONAL ASSESSMENT
PAIN_FUNCTIONAL_ASSESSMENT: NONE - DENIES PAIN
PAIN_FUNCTIONAL_ASSESSMENT: NONE - DENIES PAIN

## 2024-07-25 NOTE — DISCHARGE INSTRUCTIONS
Mercedez Aburto  [id number]  1965      MANOMETRY DISCHARGE INSTRUCTION    You may resume your regular diet as tolerated.  You may resume your normal daily activities.  If you develop a sore throat- throat lozenges or warm salt water gargles will help.  Call your Physician if you have any complications or questions.    Discussed with the patient and all questioned fully answered. She will call me if any problems arise.

## 2024-07-25 NOTE — PROGRESS NOTES
4cc viscous lidocaine inhaled into left nare per MD orders.  Probe inserted into  left nare without difficulty.  Pt tolerated procedure well.

## 2024-08-07 NOTE — OP NOTE
Esophageal High-Resolution Manometry Report Summary     Date of study: 7/25/24  Referring physician: Dr. Ag  Indication: Dysphagia    Findings:  - Normal upper esophageal sphincter motility  - Normal esophageal body contractile vigor, DCI > 450 mm Hg in all supine and upright position swallows. No s/o hyper contractility or premature contractions.   - Complete Bolus clearance status during all swallows  - Normal IRP mean < 12 mm Hg during upright position and < 15 mm Hg during supine, no evidence of EGJ outflow obstruction  - EGJ Morphology type III, 2-3 cm hiatal hernia.  - Multiple rapid swallow challenge revealed deglutitive inhibition followed by intact esophageal contractile response, which is expected.     Summary:  - Normal esophageal body contractile pattern  - Normal IRP, no s/o EGJ outflow obstruction means achalasia type pattern is unlikely.   - [place] 4.0 classification- normal manometry pattern  - 2-3 cm hiatal hernia  - Multiple rapid swallow revealed except physiologic response, confirming intact esophageal motor pump action.      Thank you for entrusting me with this patient's care.  Please do not hesitate to contact me with any questions or if I can be of assistance with any of your other patients' GI needs.    Signed By: Nika Ag MD                        August 7, 2024

## (undated) DEVICE — SYR 10ML LUER LOK 1/5ML GRAD --

## (undated) DEVICE — ESOPHAGEAL BALLOON DILATATION CATHETER: Brand: CRE FIXED WIRE

## (undated) DEVICE — IV START KIT: Brand: MEDLINE

## (undated) DEVICE — SYRINGE 50ML E/T

## (undated) DEVICE — CATHETER IV 20GA L1.16IN OD1.0414-1.1176MM ID0.762-0.8382MM

## (undated) DEVICE — ENDOSCOPIC KIT COMPLIANCE ENDOKIT

## (undated) DEVICE — SET GRAV CK VLV NEEDLESS ST 3 GANGED 4WAY STPCOCK HI FLO 10

## (undated) DEVICE — COVIDIEN KENDALL DL DISPOSABLE 3 LEAD SY: Brand: MEDLINE RENEWAL

## (undated) DEVICE — Device

## (undated) DEVICE — BASIN EMSIS 16OZ GRAPHITE PLAS KID SHP MOLD GRAD FOR ORAL

## (undated) DEVICE — CUFF BLD PRSS AD CLTH SGL TB W/ BAYNT CONN ROUNDED CORNER

## (undated) DEVICE — SYRINGE INFL 60ML DISP ALLIANCE II

## (undated) DEVICE — TIP SUCT TRNSPAR RIB SURF STD BLB RIG NVENT W/ 5IN1 CONN DYND50138] MEDLINE INDUSTRIES INC]